# Patient Record
Sex: FEMALE | Race: WHITE | NOT HISPANIC OR LATINO
[De-identification: names, ages, dates, MRNs, and addresses within clinical notes are randomized per-mention and may not be internally consistent; named-entity substitution may affect disease eponyms.]

---

## 2021-08-11 PROBLEM — Z00.00 ENCOUNTER FOR PREVENTIVE HEALTH EXAMINATION: Status: ACTIVE | Noted: 2021-08-11

## 2021-08-13 ENCOUNTER — APPOINTMENT (OUTPATIENT)
Dept: OTOLARYNGOLOGY | Facility: CLINIC | Age: 31
End: 2021-08-13
Payer: COMMERCIAL

## 2021-08-13 VITALS
HEIGHT: 61 IN | HEART RATE: 60 BPM | SYSTOLIC BLOOD PRESSURE: 114 MMHG | WEIGHT: 118 LBS | DIASTOLIC BLOOD PRESSURE: 73 MMHG | BODY MASS INDEX: 22.28 KG/M2

## 2021-08-13 DIAGNOSIS — H61.20 IMPACTED CERUMEN, UNSPECIFIED EAR: ICD-10-CM

## 2021-08-13 PROCEDURE — 99203 OFFICE O/P NEW LOW 30 MIN: CPT | Mod: 25

## 2021-08-13 PROCEDURE — 69210 REMOVE IMPACTED EAR WAX UNI: CPT

## 2021-08-13 NOTE — HISTORY OF PRESENT ILLNESS
[de-identified] : RECURRENT RIGHT SUBMANDIBULAR DISCOMFORT SINCE JANUARY THIS YEAR\par SLIGHT DISCOMFORT AT THE TIME OF EXAM\par THINKS MIGHT BE PREGNANT

## 2021-08-13 NOTE — PHYSICAL EXAM
[de-identified] : RIGHT SUBMANDIBULAR GLAND INDURATION AND DIFFERENCE IN CONSISTENCY IN FAVOR OF CHRONIC SIALOADENITIS [de-identified] : YULISA IMPACTED CERUMEN REMOVED [Normal] : lingual tonsils are normal [Midline] : trachea located in midline position [de-identified] : RIGHT SUBMANDIBULAR GLAND ASYMETRY AND SLIGHT INDURATION IN FAVOR OF CHRONIC SIALOADENTITIS/ PALPABLE LUIS M DUCT INDURATION LIKELY OBSTRUCTING STONE normal

## 2021-08-13 NOTE — REVIEW OF SYSTEMS
[Swollen Glands] : swollen glands [Negative] : Endocrine [Patient Intake Form Reviewed] : Patient intake form was reviewed

## 2021-08-20 ENCOUNTER — OUTPATIENT (OUTPATIENT)
Dept: OUTPATIENT SERVICES | Facility: HOSPITAL | Age: 31
LOS: 1 days | End: 2021-08-20
Payer: COMMERCIAL

## 2021-08-20 ENCOUNTER — APPOINTMENT (OUTPATIENT)
Dept: ULTRASOUND IMAGING | Facility: CLINIC | Age: 31
End: 2021-08-20
Payer: COMMERCIAL

## 2021-08-20 DIAGNOSIS — K11.20 SIALOADENITIS, UNSPECIFIED: ICD-10-CM

## 2021-08-20 PROCEDURE — 76536 US EXAM OF HEAD AND NECK: CPT | Mod: 26

## 2021-08-20 PROCEDURE — 76536 US EXAM OF HEAD AND NECK: CPT

## 2021-08-21 ENCOUNTER — TRANSCRIPTION ENCOUNTER (OUTPATIENT)
Age: 31
End: 2021-08-21

## 2021-08-23 ENCOUNTER — APPOINTMENT (OUTPATIENT)
Dept: OTOLARYNGOLOGY | Facility: CLINIC | Age: 31
End: 2021-08-23

## 2021-10-11 ENCOUNTER — APPOINTMENT (OUTPATIENT)
Dept: OTOLARYNGOLOGY | Facility: CLINIC | Age: 31
End: 2021-10-11
Payer: COMMERCIAL

## 2021-10-11 VITALS
SYSTOLIC BLOOD PRESSURE: 109 MMHG | HEART RATE: 58 BPM | DIASTOLIC BLOOD PRESSURE: 73 MMHG | BODY MASS INDEX: 22.66 KG/M2 | HEIGHT: 61 IN | TEMPERATURE: 98.7 F | WEIGHT: 120 LBS

## 2021-10-11 DIAGNOSIS — K11.20 SIALOADENITIS, UNSPECIFIED: ICD-10-CM

## 2021-10-11 PROCEDURE — 99214 OFFICE O/P EST MOD 30 MIN: CPT

## 2021-10-11 NOTE — END OF VISIT
[FreeTextEntry3] : I personally saw and examined CALLI DILL in detail. I spoke to LESTER Patel regarding the assessment and plan of care.  I preformed the procedures and I reviewed the above assessment and plan of care, and agree. I have made changes in changes in the body of the note where appropriate.\par \par

## 2021-10-11 NOTE — CONSULT LETTER
[Please see my note below.] : Please see my note below. [FreeTextEntry1] : Dear Dr. FRANK JOSEPH AMICO \par I had the pleasure of evaluating your patient CALLI DILL, thank you for allowing us to participate in their care. please see full note detailing our visit below.\par If you have any questions, please do not hesitate to call me and I would be happy to discuss further. \par \par Efren Saleem M.D.\par Attending Physician,  \par Department of Otolaryngology - Head and Neck Surgery\par [facility] \par Office: [phone]\par Fax: [phone]\par \par

## 2021-10-11 NOTE — HISTORY OF PRESENT ILLNESS
[de-identified] : 32 y/o F with R SMG sialoadenitis x Jan 2021, on new years day woke up with swelling and pain, and pus that was coming out the mouth, states very difficult to get saliva out. Went to an ENT at the time, was told stone started on abx with relief. Second infection was in august, got another round of abx( augmentin) with relief. \par US done- small multiple stones measuring up to 2mm within the main duct. \par pt has tried lemon, sour candies, warm compress, with hydration with mild relief. \par pt deneis autoimmune d/o, dry eyes, dry mouth

## 2021-10-11 NOTE — ASSESSMENT
[FreeTextEntry1] : 32 y/o F who presents with R SMG sialoadenitis, on exam stone palpated and with edema. \par US shows multiple stones \par - Will proceed with regiment to increase salivary flow to reduce pooling in the gland and washout any possible obstruction if possible. Recommended hydration, regular massage, sour candies, and warm compress\par - Discussed options for recurrent submandibular sialoadenitis- observation with conservative management versus interventional sialoendoscopy vs excision of gland. \par Discussed details of the regiment/procedures and risks of each including but not limited to:\par interventional sialoendoscopy - bleeding, infection, scarring, inability to remove stone, ranula, ductal perforation/avulsion, injury to surrounding nerves, seroma, persistent sx \par Combined approach - increased risk of bleeding and infections injury to surrounding nerves including lingual, hypoglossal and sensory nerves. \par complete gland removal - external scar, injury to marginal mandibular nerve etc. \par Would like to do sialoendoscopy. Will book for procedure.\par - went over recovery instructions \par if does get pregnant can try office removal but unclear if will get all out or under sialoendscopy local only

## 2021-10-11 NOTE — PHYSICAL EXAM
[Midline] : trachea located in midline position [Normal] : no rashes [de-identified] : R SMG with edema, and stone palpated

## 2022-08-08 ENCOUNTER — APPOINTMENT (OUTPATIENT)
Dept: OBGYN | Facility: CLINIC | Age: 32
End: 2022-08-08

## 2022-08-08 VITALS
HEART RATE: 70 BPM | WEIGHT: 125 LBS | HEIGHT: 61 IN | RESPIRATION RATE: 16 BRPM | TEMPERATURE: 98.1 F | SYSTOLIC BLOOD PRESSURE: 108 MMHG | DIASTOLIC BLOOD PRESSURE: 68 MMHG | BODY MASS INDEX: 23.6 KG/M2

## 2022-08-08 PROCEDURE — 99203 OFFICE O/P NEW LOW 30 MIN: CPT | Mod: 25

## 2022-08-08 PROCEDURE — 36415 COLL VENOUS BLD VENIPUNCTURE: CPT

## 2022-08-08 PROCEDURE — 76830 TRANSVAGINAL US NON-OB: CPT

## 2022-08-08 NOTE — HISTORY OF PRESENT ILLNESS
[FreeTextEntry1] : Patient is a 31-year-old female who presents stating that she is pregnant as a product of IVF.  Patient states that her transfer date was June 16, 2022 of a 5-day embryo.  Patient states she was given a due date of March 4, 2023.  That would put the patient at 10 weeks 2 days gestation.  Patient advised to continue daily baby aspirin 81 mg.  Patient has discontinued estrogen and progesterone.

## 2022-08-08 NOTE — PROCEDURE
[Transvaginal OB Sonogram] : Transvaginal OB Sonogram [Intrauterine Pregnancy] : intrauterine pregnancy [Yolk Sac] : yolk sac present [Fetal Heart] : fetal heart present [CRL: ___ (mm)] : CRL - [unfilled]Umm [Date: ___] : Date: [unfilled] [Current GA by Sonogram: ___ (wks)] : Current GA by Sonogram: [unfilled]Uwks [___ day(s)] : [unfilled] days [Transvaginal OB Sonogram WNL] : Transvaginal OB Sonogram WNL [FreeTextEntry1] : Transvaginal OB ultrasound performed: A viable shaffer intrauterine gestation is seen, fetal heart motion is noted, fetal movement is seen, crown-rump length is consistent with 10 weeks 6 days gestation.  This correlates with the patient's dates

## 2022-08-09 LAB
ABO + RH PNL BLD: NORMAL
APPEARANCE: CLEAR
B19V IGG SER QL IA: 5.18 INDEX
B19V IGG+IGM SER-IMP: NORMAL
B19V IGG+IGM SER-IMP: POSITIVE
B19V IGM FLD-ACNC: 0.34 INDEX
B19V IGM SER-ACNC: NEGATIVE
BACTERIA: NEGATIVE
BASOPHILS # BLD AUTO: 0.07 K/UL
BASOPHILS NFR BLD AUTO: 0.8 %
BILIRUBIN URINE: NEGATIVE
BLD GP AB SCN SERPL QL: NORMAL
BLOOD URINE: NEGATIVE
CMV IGG SERPL QL: <0.2 U/ML
CMV IGG SERPL-IMP: NEGATIVE
CMV IGM SERPL QL: <8 AU/ML
CMV IGM SERPL QL: NEGATIVE
COLOR: COLORLESS
EOSINOPHIL # BLD AUTO: 0.31 K/UL
EOSINOPHIL NFR BLD AUTO: 3.6 %
GLUCOSE QUALITATIVE U: NEGATIVE
HBV SURFACE AG SER QL: NONREACTIVE
HCT VFR BLD CALC: 37 %
HCV AB SER QL: NONREACTIVE
HCV S/CO RATIO: 0.1 S/CO
HGB BLD-MCNC: 12.3 G/DL
HIV1+2 AB SPEC QL IA.RAPID: NONREACTIVE
HYALINE CASTS: 1 /LPF
IMM GRANULOCYTES NFR BLD AUTO: 0.5 %
KETONES URINE: NEGATIVE
LEUKOCYTE ESTERASE URINE: NEGATIVE
LYMPHOCYTES # BLD AUTO: 2.54 K/UL
LYMPHOCYTES NFR BLD AUTO: 29.7 %
MAN DIFF?: NORMAL
MCHC RBC-ENTMCNC: 28.5 PG
MCHC RBC-ENTMCNC: 33.2 GM/DL
MCV RBC AUTO: 85.8 FL
MEV IGG FLD QL IA: 35.9 AU/ML
MEV IGG+IGM SER-IMP: POSITIVE
MICROSCOPIC-UA: NORMAL
MONOCYTES # BLD AUTO: 0.51 K/UL
MONOCYTES NFR BLD AUTO: 6 %
NEUTROPHILS # BLD AUTO: 5.07 K/UL
NEUTROPHILS NFR BLD AUTO: 59.4 %
NITRITE URINE: NEGATIVE
PH URINE: 6.5
PLATELET # BLD AUTO: 336 K/UL
PROTEIN URINE: NEGATIVE
RBC # BLD: 4.31 M/UL
RBC # FLD: 13.5 %
RED BLOOD CELLS URINE: 3 /HPF
RUBV IGG FLD-ACNC: 3.2 INDEX
RUBV IGG SER-IMP: POSITIVE
SPECIFIC GRAVITY URINE: 1
SQUAMOUS EPITHELIAL CELLS: 1 /HPF
T GONDII AB SER-IMP: NEGATIVE
T GONDII AB SER-IMP: NEGATIVE
T GONDII IGG SER QL: <3 IU/ML
T GONDII IGM SER QL: <3 AU/ML
T PALLIDUM AB SER QL IA: NEGATIVE
T4 SERPL-MCNC: 11.2 UG/DL
TSH SERPL-ACNC: 0.92 UIU/ML
UROBILINOGEN URINE: NORMAL
VZV AB TITR SER: POSITIVE
VZV IGG SER IF-ACNC: 718.1 INDEX
WBC # FLD AUTO: 8.54 K/UL
WHITE BLOOD CELLS URINE: 4 /HPF

## 2022-08-10 LAB — BACTERIA UR CULT: NORMAL

## 2022-08-12 LAB — FMR1 GENE MUT ANL BLD/T: NORMAL

## 2022-08-13 LAB — AR GENE MUT ANL BLD/T: NORMAL

## 2022-08-16 LAB — CFTR MUT TESTED BLD/T: NEGATIVE

## 2022-08-22 ENCOUNTER — NON-APPOINTMENT (OUTPATIENT)
Age: 32
End: 2022-08-22

## 2022-08-22 ENCOUNTER — APPOINTMENT (OUTPATIENT)
Dept: OBGYN | Facility: CLINIC | Age: 32
End: 2022-08-22

## 2022-08-22 VITALS
SYSTOLIC BLOOD PRESSURE: 98 MMHG | HEIGHT: 61 IN | WEIGHT: 125 LBS | DIASTOLIC BLOOD PRESSURE: 60 MMHG | BODY MASS INDEX: 23.6 KG/M2

## 2022-08-22 DIAGNOSIS — Z11.3 ENCOUNTER FOR SCREENING FOR INFECTIONS WITH A PREDOMINANTLY SEXUAL MODE OF TRANSMISSION: ICD-10-CM

## 2022-08-22 DIAGNOSIS — Z12.4 ENCOUNTER FOR SCREENING FOR MALIGNANT NEOPLASM OF CERVIX: ICD-10-CM

## 2022-08-22 PROCEDURE — 0501F PRENATAL FLOW SHEET: CPT

## 2022-08-22 PROCEDURE — 76830 TRANSVAGINAL US NON-OB: CPT

## 2022-08-22 NOTE — PROCEDURE
[Intrauterine Pregnancy] : intrauterine pregnancy [Yolk Sac] : yolk sac present [Fetal Heart] : fetal heart present [CRL: ___ (mm)] : CRL - [unfilled]Umm [Date: ___] : EDC: [unfilled] [Current GA by Sonogram: ___ (wks)] : Current GA by Sonogram: [unfilled]Uwks [___ day(s)] : [unfilled] days [WNL] : Transvaginal OB Sonogram WNL [FreeTextEntry1] : Transvaginal OB ultrasound performed: A viable shaffer intrauterine gestation is seen, fetal heart motion is noted, fetal movement is seen, crown-rump length consistent with 12 weeks 6 days gestation.  This correlates with the patient's dates.

## 2022-08-23 ENCOUNTER — ASOB RESULT (OUTPATIENT)
Age: 32
End: 2022-08-23

## 2022-08-23 ENCOUNTER — APPOINTMENT (OUTPATIENT)
Dept: ANTEPARTUM | Facility: CLINIC | Age: 32
End: 2022-08-23

## 2022-08-23 LAB
C TRACH RRNA SPEC QL NAA+PROBE: NOT DETECTED
HPV HIGH+LOW RISK DNA PNL CVX: NOT DETECTED
N GONORRHOEA RRNA SPEC QL NAA+PROBE: NOT DETECTED
SOURCE TP AMPLIFICATION: NORMAL

## 2022-08-23 PROCEDURE — 76813 OB US NUCHAL MEAS 1 GEST: CPT

## 2022-08-24 ENCOUNTER — NON-APPOINTMENT (OUTPATIENT)
Age: 32
End: 2022-08-24

## 2022-08-24 DIAGNOSIS — N76.0 ACUTE VAGINITIS: ICD-10-CM

## 2022-08-24 DIAGNOSIS — B96.89 ACUTE VAGINITIS: ICD-10-CM

## 2022-08-24 LAB
CANDIDA VAG CYTO: NOT DETECTED
CYTOLOGY CVX/VAG DOC THIN PREP: NORMAL
G VAGINALIS+PREV SP MTYP VAG QL MICRO: DETECTED
T VAGINALIS VAG QL WET PREP: NOT DETECTED

## 2022-08-25 ENCOUNTER — NON-APPOINTMENT (OUTPATIENT)
Age: 32
End: 2022-08-25

## 2022-08-27 LAB — AFP PNL SERPL: NORMAL

## 2022-09-07 ENCOUNTER — NON-APPOINTMENT (OUTPATIENT)
Age: 32
End: 2022-09-07

## 2022-09-13 ENCOUNTER — NON-APPOINTMENT (OUTPATIENT)
Age: 32
End: 2022-09-13

## 2022-09-20 ENCOUNTER — NON-APPOINTMENT (OUTPATIENT)
Age: 32
End: 2022-09-20

## 2022-09-21 ENCOUNTER — APPOINTMENT (OUTPATIENT)
Dept: OBGYN | Facility: CLINIC | Age: 32
End: 2022-09-21

## 2022-09-21 VITALS
WEIGHT: 131 LBS | HEIGHT: 61 IN | BODY MASS INDEX: 24.73 KG/M2 | DIASTOLIC BLOOD PRESSURE: 60 MMHG | SYSTOLIC BLOOD PRESSURE: 100 MMHG

## 2022-09-21 PROCEDURE — 90686 IIV4 VACC NO PRSV 0.5 ML IM: CPT

## 2022-09-21 PROCEDURE — 0502F SUBSEQUENT PRENATAL CARE: CPT

## 2022-09-21 PROCEDURE — 90471 IMMUNIZATION ADMIN: CPT

## 2022-09-28 LAB
AFP PNL SERPL: NORMAL
CANDIDA VAG CYTO: NOT DETECTED
G VAGINALIS+PREV SP MTYP VAG QL MICRO: NOT DETECTED
T VAGINALIS VAG QL WET PREP: NOT DETECTED

## 2022-10-17 ENCOUNTER — NON-APPOINTMENT (OUTPATIENT)
Age: 32
End: 2022-10-17

## 2022-10-18 ENCOUNTER — APPOINTMENT (OUTPATIENT)
Dept: ANTEPARTUM | Facility: CLINIC | Age: 32
End: 2022-10-18

## 2022-10-18 ENCOUNTER — NON-APPOINTMENT (OUTPATIENT)
Age: 32
End: 2022-10-18

## 2022-10-18 ENCOUNTER — ASOB RESULT (OUTPATIENT)
Age: 32
End: 2022-10-18

## 2022-10-18 PROCEDURE — 76815 OB US LIMITED FETUS(S): CPT

## 2022-10-19 ENCOUNTER — TRANSCRIPTION ENCOUNTER (OUTPATIENT)
Age: 32
End: 2022-10-19

## 2022-10-19 ENCOUNTER — APPOINTMENT (OUTPATIENT)
Dept: OBGYN | Facility: CLINIC | Age: 32
End: 2022-10-19

## 2022-10-19 VITALS
WEIGHT: 135 LBS | HEART RATE: 72 BPM | OXYGEN SATURATION: 99 % | DIASTOLIC BLOOD PRESSURE: 68 MMHG | HEIGHT: 61 IN | BODY MASS INDEX: 25.49 KG/M2 | SYSTOLIC BLOOD PRESSURE: 104 MMHG

## 2022-10-19 VITALS — BODY MASS INDEX: 25.49 KG/M2 | WEIGHT: 135 LBS | HEIGHT: 61 IN

## 2022-10-19 DIAGNOSIS — Z01.818 ENCOUNTER FOR OTHER PREPROCEDURAL EXAMINATION: ICD-10-CM

## 2022-10-19 LAB
APTT BLD: 32.5 SEC
BASOPHILS # BLD AUTO: 0.07 K/UL
BASOPHILS NFR BLD AUTO: 1.1 %
EOSINOPHIL # BLD AUTO: 0.1 K/UL
EOSINOPHIL NFR BLD AUTO: 1.5 %
FIBRINOGEN PPP COAG.DERIVED-MCNC: 564 MG/DL
HCT VFR BLD CALC: 36.5 %
HGB BLD-MCNC: 12.1 G/DL
IMM GRANULOCYTES NFR BLD AUTO: 0.3 %
INR PPP: 0.98 RATIO
LYMPHOCYTES # BLD AUTO: 2.21 K/UL
LYMPHOCYTES NFR BLD AUTO: 33.9 %
MAN DIFF?: NORMAL
MCHC RBC-ENTMCNC: 28.8 PG
MCHC RBC-ENTMCNC: 33.2 GM/DL
MCV RBC AUTO: 86.9 FL
MONOCYTES # BLD AUTO: 0.37 K/UL
MONOCYTES NFR BLD AUTO: 5.7 %
NEUTROPHILS # BLD AUTO: 3.74 K/UL
NEUTROPHILS NFR BLD AUTO: 57.5 %
PLATELET # BLD AUTO: 276 K/UL
PT BLD: 11.6 SEC
RBC # BLD: 4.2 M/UL
RBC # FLD: 14 %
WBC # FLD AUTO: 6.51 K/UL

## 2022-10-19 PROCEDURE — 59200 INSERT CERVICAL DILATOR: CPT

## 2022-10-19 PROCEDURE — 99215 OFFICE O/P EST HI 40 MIN: CPT | Mod: 25

## 2022-10-19 RX ORDER — ACETAMINOPHEN 500 MG
975 TABLET ORAL ONCE
Refills: 0 | Status: COMPLETED | OUTPATIENT
Start: 2022-10-20 | End: 2022-10-20

## 2022-10-19 RX ORDER — AZITHROMYCIN 500 MG/1
500 TABLET, FILM COATED ORAL ONCE
Refills: 0 | Status: DISCONTINUED | OUTPATIENT
Start: 2022-10-20 | End: 2022-10-20

## 2022-10-19 RX ORDER — SODIUM CHLORIDE 9 MG/ML
3 INJECTION INTRAMUSCULAR; INTRAVENOUS; SUBCUTANEOUS ONCE
Refills: 0 | Status: DISCONTINUED | OUTPATIENT
Start: 2022-10-20 | End: 2022-10-20

## 2022-10-19 NOTE — DISCUSSION/SUMMARY
[FreeTextEntry1] : 33 yo  with fetal demise measuring 17 weeks for dilation and evacuation on 10/20/22\par \par 1.Dilation and Evacuation \par -All available records and ultrasounds have been reviewed \par - Pt does not desire induction of labor\par -All consents reviewed and/or signed today, all questions/concerns addressed\par - Patient offered pamphlet for support services- accepted\par - Patient offered fetal footprints - unsure\par - Genetics - s/p PGD\par - Reviewed disposal of remains, hospital vs. private burial.  Patient desires routine hospital disposal.\par \par 2. Surgery scheduling\par - Patient to be precertified for D+E\par - D+E scheduled for 10/20/22\par -COVID testing scheduled and reviewed\par \par 3. ID/Cervical prep\par - GC/CT- not indicated\par - Azithromycin 500mg in OR\par - Ibuprofen 600 mg po q 6 hours - Rx sent\par - Azithromycin 500mg  BID x 1 day for day of dilator placement\par \par 4. Labs/Blood type\par - to get CBC, T+S, coags this AM\par - TS on arrival\par \par 5. Contraception/Future Pregnancy Plans\par - Patient desires pregnancy\par \par 6. Post-op\par - Post-operative telehealth or in person visit optional- to be scheduled in 2 weeks\par - Post-operative instruction sheet reviewed/given, reviewed bleeding and infection precautions\par - Provided 24 hour contact phone number\par - All questions/concerns of patient addressed to their satisfaction\par

## 2022-10-19 NOTE — HISTORY OF PRESENT ILLNESS
[FreeTextEntry1] : 31 yo  with fetal demise measuring 17 weeks (at 20w4d by embyo transfer date). Pt was diagnosed yesterday and scheduled urgently today for procedure this week. This pregnancy was significant for recent covid inefection\par \par All: doxycycline, sulfa drugs\par Meds: levothyroxine 50mcg\par Obhx: IVF pregnancy\par Gynhx: PCOS\par PMH: hypothyroids\par PSH: denies\par SH: deniesx3\par \par \par \par D+E Counseling\par \par The patient presents aware of all options for the pregnancy, including continuation of pregnancy, labor induction, and dilation and evacuation (D&E). They desire termination. They do not desire a labor induction and are requesting a D&E.  Patient aware specimen does not come out intact.\par \par Risks of D&E including:\par 1.	Infection: Patient was counseled on risk of infection and the use of prophylactic antibiotics, signs/symptoms of pre- and post-operative infection were reviewed. \par 2.	Hemorrhage: Patient was counseled on the risk of hemorrhage, possibly requiring blood (and/or blood products) transfusion, management including use of but not limited to uterotonic medications. PT HAS NO OBJECTIONS TO BLOOD TRANSFUSION OR RECEIVING BLOOD PRODUCTS.\par 3.	Injury/Perforation:  Risk of injury to vagina, cervix, uterus reviewed. Patient was counseled on the risk of uterine perforation with/without need for laparoscopy/laparotomy with/without injury to adjacent organs such as bowel/bladder. Reviewed risk of hysterectomy.\par 4.            Risk of retained products of conception  with/without need for medication or suction procedure to empty the uterus. \par \par The risk of harm to subsequent pregnancies or the ability to carry a subsequent pregnancy to term, and adverse psychological effects were discussed.  \par \par Need for cervical ripening with misoprostol, mifepristone, pre-operative laminaria placement; the accompanying risks of infection, bleeding, injury, rupture of membranes, and  labor were reviewed. The risks of delivery of a nonviable  were reviewed.  They understand these risks and agrees to above. \par \par The patient also understands it is their responsibility to bring to the attention of their physician any unusual symptoms following the  and to report to follow-up examinations.  \par \par They are sure of their decision and deny any coercion from family, friends or healthcare providers. The patient had the opportunity to ask questions and all questions were answered.  \par \par

## 2022-10-19 NOTE — PHYSICAL EXAM
[Chaperone Present] : A chaperone was present in the examining room during all aspects of the physical examination [Appropriately responsive] : appropriately responsive [Alert] : alert [No Acute Distress] : no acute distress [Soft] : soft [Non-tender] : non-tender [Non-distended] : non-distended [FreeTextEntry1] : Dina Cooper LPN

## 2022-10-20 ENCOUNTER — APPOINTMENT (OUTPATIENT)
Dept: OBGYN | Facility: HOSPITAL | Age: 32
End: 2022-10-20

## 2022-10-20 ENCOUNTER — TRANSCRIPTION ENCOUNTER (OUTPATIENT)
Age: 32
End: 2022-10-20

## 2022-10-20 ENCOUNTER — APPOINTMENT (OUTPATIENT)
Dept: OBGYN | Facility: CLINIC | Age: 32
End: 2022-10-20

## 2022-10-20 ENCOUNTER — RESULT REVIEW (OUTPATIENT)
Age: 32
End: 2022-10-20

## 2022-10-20 ENCOUNTER — OUTPATIENT (OUTPATIENT)
Dept: INPATIENT UNIT | Facility: HOSPITAL | Age: 32
LOS: 1 days | End: 2022-10-20
Payer: COMMERCIAL

## 2022-10-20 VITALS
HEART RATE: 62 BPM | RESPIRATION RATE: 20 BRPM | SYSTOLIC BLOOD PRESSURE: 98 MMHG | DIASTOLIC BLOOD PRESSURE: 63 MMHG | OXYGEN SATURATION: 100 % | TEMPERATURE: 98 F

## 2022-10-20 VITALS
DIASTOLIC BLOOD PRESSURE: 61 MMHG | RESPIRATION RATE: 16 BRPM | SYSTOLIC BLOOD PRESSURE: 110 MMHG | HEIGHT: 61 IN | OXYGEN SATURATION: 100 % | TEMPERATURE: 98 F | HEART RATE: 889 BPM | WEIGHT: 134.92 LBS

## 2022-10-20 DIAGNOSIS — Z3A.17 17 WEEKS GESTATION OF PREGNANCY: ICD-10-CM

## 2022-10-20 DIAGNOSIS — O02.1 MISSED ABORTION: ICD-10-CM

## 2022-10-20 LAB — SARS-COV-2 N GENE NPH QL NAA+PROBE: NOT DETECTED

## 2022-10-20 PROCEDURE — 88305 TISSUE EXAM BY PATHOLOGIST: CPT | Mod: 26

## 2022-10-20 PROCEDURE — 76998 US GUIDE INTRAOP: CPT | Mod: 26,GC

## 2022-10-20 PROCEDURE — ZZZZZ: CPT

## 2022-10-20 PROCEDURE — 59841 INDUCED ABORTION DILAT&EVAC: CPT

## 2022-10-20 PROCEDURE — 59821 TREATMENT OF MISCARRIAGE: CPT | Mod: GC

## 2022-10-20 PROCEDURE — 36415 COLL VENOUS BLD VENIPUNCTURE: CPT

## 2022-10-20 PROCEDURE — 88305 TISSUE EXAM BY PATHOLOGIST: CPT

## 2022-10-20 RX ORDER — OXYCODONE AND ACETAMINOPHEN 5; 325 MG/1; MG/1
2 TABLET ORAL ONCE
Refills: 0 | Status: DISCONTINUED | OUTPATIENT
Start: 2022-10-20 | End: 2022-10-20

## 2022-10-20 RX ORDER — ONDANSETRON 8 MG/1
8 TABLET, FILM COATED ORAL ONCE
Refills: 0 | Status: DISCONTINUED | OUTPATIENT
Start: 2022-10-20 | End: 2022-10-20

## 2022-10-20 RX ORDER — ONDANSETRON 8 MG/1
4 TABLET, FILM COATED ORAL ONCE
Refills: 0 | Status: DISCONTINUED | OUTPATIENT
Start: 2022-10-20 | End: 2022-10-20

## 2022-10-20 RX ORDER — LEVOTHYROXINE SODIUM 125 MCG
1 TABLET ORAL
Qty: 0 | Refills: 0 | DISCHARGE

## 2022-10-20 RX ORDER — OXYCODONE AND ACETAMINOPHEN 5; 325 MG/1; MG/1
1 TABLET ORAL ONCE
Refills: 0 | Status: DISCONTINUED | OUTPATIENT
Start: 2022-10-20 | End: 2022-10-20

## 2022-10-20 RX ORDER — SODIUM CHLORIDE 9 MG/ML
1000 INJECTION, SOLUTION INTRAVENOUS
Refills: 0 | Status: DISCONTINUED | OUTPATIENT
Start: 2022-10-20 | End: 2022-10-20

## 2022-10-20 RX ORDER — HYDROMORPHONE HYDROCHLORIDE 2 MG/ML
0.5 INJECTION INTRAMUSCULAR; INTRAVENOUS; SUBCUTANEOUS ONCE
Refills: 0 | Status: DISCONTINUED | OUTPATIENT
Start: 2022-10-20 | End: 2022-10-20

## 2022-10-20 RX ADMIN — Medication 975 MILLIGRAM(S): at 15:11

## 2022-10-20 NOTE — BRIEF OPERATIVE NOTE - OPERATION/FINDINGS
Anteverted 17 week sized uterus, cervix dilated to 1 cm. Normal appearing external and internal genitalia.

## 2022-10-20 NOTE — ASU DISCHARGE PLAN (ADULT/PEDIATRIC) - ASU DC SPECIAL INSTRUCTIONSFT
Please call your provider to schedule postoperative visit in 2 weeks. Take medications as directed, regular diet, activity as tolerated. Nothing per vagina for 2 weeks (incl. sex, douching, etc). If you have additional concerns, please inform your provider.

## 2022-10-20 NOTE — BRIEF OPERATIVE NOTE - NSICDXBRIEFPOSTOP_GEN_ALL_CORE_FT
POST-OP DIAGNOSIS:  Missed  with fetal demise before 20 completed weeks of gestation 20-Oct-2022 17:15:01  Bhavna Brown

## 2022-10-20 NOTE — ASU DISCHARGE PLAN (ADULT/PEDIATRIC) - CARE PROVIDER_API CALL
Merlyn Wong; MPH)  Obstetrics and Gynecology  [address] [place] [address], [facility] [address]  [place] [address]  Phone: [phone]  Fax: [phone]  Follow Up Time:

## 2022-10-20 NOTE — ASU DISCHARGE PLAN (ADULT/PEDIATRIC) - NS MD DC FALL RISK RISK
For information on Fall & Injury Prevention, visit: [email or web address] OR  [email or web address] OR  https://www.cdc.gov/steadi/patient.html

## 2022-10-20 NOTE — BRIEF OPERATIVE NOTE - NSICDXBRIEFPREOP_GEN_ALL_CORE_FT
PRE-OP DIAGNOSIS:  Missed  with fetal demise before 20 completed weeks of gestation 20-Oct-2022 17:14:25  Bhavna Brown

## 2022-10-20 NOTE — BRIEF OPERATIVE NOTE - NSICDXBRIEFPROCEDURE_GEN_ALL_CORE_FT
PROCEDURES:  Dilation and evacuation, uterus, using suction, with US guidance 20-Oct-2022 17:13:56  Bhavna Brown

## 2022-10-27 LAB — SURGICAL PATHOLOGY STUDY: SIGNIFICANT CHANGE UP

## 2022-10-28 ENCOUNTER — APPOINTMENT (OUTPATIENT)
Dept: PEDIATRIC CARDIOLOGY | Facility: CLINIC | Age: 32
End: 2022-10-28

## 2022-11-02 ENCOUNTER — APPOINTMENT (OUTPATIENT)
Dept: OBGYN | Facility: CLINIC | Age: 32
End: 2022-11-02

## 2022-11-02 PROCEDURE — 99024 POSTOP FOLLOW-UP VISIT: CPT

## 2022-11-03 NOTE — HISTORY OF PRESENT ILLNESS
[FreeTextEntry1] : Audiovisual Televisit\par Pt location: home. [address], [place]\par Provider location: office, [address], [place]\par \par 31 yo s/p DE for fetal demise at 20 weks measuring ~17 weeks presenting for follow up. Phsycially doing well, no pain, minimal bleeding. Pyschosociall grieving. Encouraged to reach out to   bereavement group. I offered to reach out on her behalf but she says she will do on her own.\par Reviewed pathology. Minimal infomration about placenta. Pt was told by primary ob that placenta did nto look right. For peace of mind, I will reach out to [facility] pathology to inquire if there is any more info. Reviewed that pathologist will usually comment on abnormal appearing placenta, ie- calcifications, clots etc.

## 2022-11-03 NOTE — DISCUSSION/SUMMARY
[FreeTextEntry1] : 31 yo s/p DE for fetal demise\par \par 1.Dilation and Evacuation\par -Patient is recovering well.  No signs/symptoms of infection. \par -Reviewed pathology from procedure- will f/u with pathology re placenta\par -Reviewed that first period may be heavier than normal. \par -Patient is cleared to return to all physical activities\par \par 2.Contraception/ Conception\par - Wants to get Covid booster before trying again with LORETTA, has LORETTA follow up\par \par 3.  Psych\par - Discussed normal grieving process, reviewed support people\par - pt given social work/psych information sheet at consultation\par \par 4. Follow-up\par - Patient referred back to her primary Ob-gyn, Dr. Foy for routine care\par - - all questions/concerns addressed of pt to their satisfaction\par

## 2023-04-18 ENCOUNTER — APPOINTMENT (OUTPATIENT)
Dept: OBGYN | Facility: CLINIC | Age: 33
End: 2023-04-18
Payer: COMMERCIAL

## 2023-04-18 VITALS
BODY MASS INDEX: 24.35 KG/M2 | WEIGHT: 129 LBS | HEART RATE: 79 BPM | HEIGHT: 61 IN | TEMPERATURE: 98.4 F | OXYGEN SATURATION: 98 % | RESPIRATION RATE: 14 BRPM | SYSTOLIC BLOOD PRESSURE: 115 MMHG | DIASTOLIC BLOOD PRESSURE: 64 MMHG

## 2023-04-18 PROCEDURE — 81003 URINALYSIS AUTO W/O SCOPE: CPT | Mod: NC,QW

## 2023-04-18 PROCEDURE — 76817 TRANSVAGINAL US OBSTETRIC: CPT

## 2023-04-18 PROCEDURE — 81025 URINE PREGNANCY TEST: CPT

## 2023-04-18 PROCEDURE — 99215 OFFICE O/P EST HI 40 MIN: CPT | Mod: 25
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
## 2023-04-28 ENCOUNTER — ASOB RESULT (OUTPATIENT)
Age: 33
End: 2023-04-28

## 2023-04-28 ENCOUNTER — APPOINTMENT (OUTPATIENT)
Dept: MATERNAL FETAL MEDICINE | Facility: CLINIC | Age: 33
End: 2023-04-28
Payer: COMMERCIAL

## 2023-04-28 PROCEDURE — 99202 OFFICE O/P NEW SF 15 MIN: CPT | Mod: 95

## 2023-05-10 ENCOUNTER — ASOB RESULT (OUTPATIENT)
Age: 33
End: 2023-05-10

## 2023-05-10 ENCOUNTER — APPOINTMENT (OUTPATIENT)
Dept: ANTEPARTUM | Facility: CLINIC | Age: 33
End: 2023-05-10
Payer: COMMERCIAL

## 2023-05-10 ENCOUNTER — APPOINTMENT (OUTPATIENT)
Dept: MATERNAL FETAL MEDICINE | Facility: CLINIC | Age: 33
End: 2023-05-10
Payer: COMMERCIAL

## 2023-05-10 VITALS
OXYGEN SATURATION: 98 % | HEART RATE: 84 BPM | DIASTOLIC BLOOD PRESSURE: 64 MMHG | WEIGHT: 133 LBS | SYSTOLIC BLOOD PRESSURE: 110 MMHG | BODY MASS INDEX: 25.13 KG/M2

## 2023-05-10 DIAGNOSIS — O35.9XX0 MATERNAL CARE FOR (SUSPECTED) FETAL ABNORMALITY AND DAMAGE, UNSPECIFIED, NOT APPLICABLE OR UNSPECIFIED: ICD-10-CM

## 2023-05-10 DIAGNOSIS — Z83.49 FAMILY HISTORY OF OTHER ENDOCRINE, NUTRITIONAL AND METABOLIC DISEASES: ICD-10-CM

## 2023-05-10 DIAGNOSIS — Z82.0 FAMILY HISTORY OF EPILEPSY AND OTHER DISEASES OF THE NERVOUS SYSTEM: ICD-10-CM

## 2023-05-10 DIAGNOSIS — Z3A.12 12 WEEKS GESTATION OF PREGNANCY: ICD-10-CM

## 2023-05-10 DIAGNOSIS — Z87.42 PERSONAL HISTORY OF OTHER DISEASES OF THE FEMALE GENITAL TRACT: ICD-10-CM

## 2023-05-10 PROCEDURE — 76813 OB US NUCHAL MEAS 1 GEST: CPT

## 2023-05-10 PROCEDURE — ZZZZZ: CPT

## 2023-05-10 PROCEDURE — 99215 OFFICE O/P EST HI 40 MIN: CPT

## 2023-05-10 PROCEDURE — 36416 COLLJ CAPILLARY BLOOD SPEC: CPT

## 2023-05-10 RX ORDER — METRONIDAZOLE 7.5 MG/G
0.75 GEL VAGINAL
Qty: 1 | Refills: 0 | Status: DISCONTINUED | COMMUNITY
Start: 2022-08-24 | End: 2023-05-10

## 2023-05-10 RX ORDER — CHOLECALCIFEROL (VITAMIN D3) 25 MCG
27-0.8-228 TABLET,CHEWABLE ORAL
Qty: 30 | Refills: 6 | Status: ACTIVE | COMMUNITY
Start: 2023-05-10

## 2023-05-10 RX ORDER — KRILL/OM-3/DHA/EPA/PHOSPHO/AST 1000-230MG
81 CAPSULE ORAL DAILY
Qty: 30 | Refills: 5 | Status: ACTIVE | COMMUNITY
Start: 2023-05-10

## 2023-05-10 RX ORDER — AZITHROMYCIN 500 MG/1
500 TABLET, FILM COATED ORAL
Qty: 2 | Refills: 0 | Status: DISCONTINUED | COMMUNITY
Start: 2022-10-19 | End: 2023-05-10

## 2023-05-10 RX ORDER — IBUPROFEN 600 MG/1
600 TABLET, FILM COATED ORAL 4 TIMES DAILY
Qty: 60 | Refills: 0 | Status: DISCONTINUED | COMMUNITY
Start: 2022-10-19 | End: 2023-05-10

## 2023-05-15 ENCOUNTER — NON-APPOINTMENT (OUTPATIENT)
Age: 33
End: 2023-05-15

## 2023-05-15 LAB
ADDITIONAL US: NORMAL
COMMENTS: AFP: NORMAL
CRL SCAN TWIN B: NORMAL
CRL SCAN: NORMAL
CROWN RUMP LENGTH TWIN B: NORMAL
CROWN RUMP LENGTH: 61.3 MM
DOWN SYNDROME AGE RISK: NORMAL
DOWN SYNDROME INTERPRETATION: NORMAL
DOWN SYNDROME SCREENING RISK: NORMAL
GEST. AGE ON COLLECTION DATE: 12.4 WEEKS
HCG MOM: 0.77
HCG VALUE: 83.1 IU/ML
MATERNAL AGE AT EDD: 32.5 YR
NOTE: AFP: NORMAL
NT MOM TWIN B: NORMAL
NT TWIN B: NORMAL
NUCHAL TRANSLUCENCY (NT): 1.5 MM
NUCHAL TRANSLUCENCY MOM: 1.03
NUMBER OF FETUSES: 1
PAPP-A MOM: 0.35
PAPP-A VALUE: 403.2 NG/ML
RACE: NORMAL
RESULTS AFP: NORMAL
SONOGRAPHER ID#: NORMAL
SUBMIT PART 2 SAMPLE USING: NORMAL
TEST RESULTS: AFP: NORMAL
TRISOMY 18 AGE RISK: NORMAL
TRISOMY 18 INTERPRETATION: NORMAL
TRISOMY 18 SCREENING RISK: NORMAL
WEIGHT AFP: 133 LBS

## 2023-05-16 ENCOUNTER — APPOINTMENT (OUTPATIENT)
Dept: OBGYN | Facility: CLINIC | Age: 33
End: 2023-05-16
Payer: COMMERCIAL

## 2023-05-16 ENCOUNTER — LABORATORY RESULT (OUTPATIENT)
Age: 33
End: 2023-05-16

## 2023-05-16 VITALS
RESPIRATION RATE: 16 BRPM | OXYGEN SATURATION: 97 % | HEART RATE: 94 BPM | BODY MASS INDEX: 25.13 KG/M2 | TEMPERATURE: 98 F | DIASTOLIC BLOOD PRESSURE: 64 MMHG | WEIGHT: 133 LBS | SYSTOLIC BLOOD PRESSURE: 100 MMHG

## 2023-05-16 DIAGNOSIS — Z34.01 ENCOUNTER FOR SUPERVISION OF NORMAL FIRST PREGNANCY, FIRST TRIMESTER: ICD-10-CM

## 2023-05-16 PROCEDURE — 36415 COLL VENOUS BLD VENIPUNCTURE: CPT

## 2023-05-16 PROCEDURE — 0502F SUBSEQUENT PRENATAL CARE: CPT

## 2023-05-16 PROCEDURE — 76817 TRANSVAGINAL US OBSTETRIC: CPT

## 2023-05-16 PROCEDURE — 81003 URINALYSIS AUTO W/O SCOPE: CPT | Mod: NC,QW

## 2023-05-17 ENCOUNTER — NON-APPOINTMENT (OUTPATIENT)
Age: 33
End: 2023-05-17

## 2023-05-17 LAB
APPEARANCE: CLEAR
BASOPHILS # BLD AUTO: 0.05 K/UL
BASOPHILS NFR BLD AUTO: 0.7 %
BILIRUBIN URINE: NEGATIVE
BLOOD URINE: NEGATIVE
COLOR: YELLOW
EOSINOPHIL # BLD AUTO: 0.1 K/UL
EOSINOPHIL NFR BLD AUTO: 1.4 %
GLUCOSE QUALITATIVE U: NEGATIVE MG/DL
HCT VFR BLD CALC: 33.9 %
HGB BLD-MCNC: 10.7 G/DL
HIV1+2 AB SPEC QL IA.RAPID: NONREACTIVE
IMM GRANULOCYTES NFR BLD AUTO: 0.4 %
KETONES URINE: NEGATIVE MG/DL
LEUKOCYTE ESTERASE URINE: ABNORMAL
LYMPHOCYTES # BLD AUTO: 2.65 K/UL
LYMPHOCYTES NFR BLD AUTO: 37.4 %
MAN DIFF?: NORMAL
MCHC RBC-ENTMCNC: 25.1 PG
MCHC RBC-ENTMCNC: 31.6 GM/DL
MCV RBC AUTO: 79.6 FL
MONOCYTES # BLD AUTO: 0.43 K/UL
MONOCYTES NFR BLD AUTO: 6.1 %
NEUTROPHILS # BLD AUTO: 3.83 K/UL
NEUTROPHILS NFR BLD AUTO: 54 %
NITRITE URINE: NEGATIVE
PH URINE: 6
PLATELET # BLD AUTO: 325 K/UL
PROTEIN URINE: NEGATIVE MG/DL
RBC # BLD: 4.26 M/UL
RBC # FLD: 18.4 %
SPECIFIC GRAVITY URINE: 1.01
TSH SERPL-ACNC: 0.76 UIU/ML
UROBILINOGEN URINE: 0.2 MG/DL
WBC # FLD AUTO: 7.09 K/UL

## 2023-05-18 LAB
ABO + RH PNL BLD: NORMAL
BACTERIA UR CULT: NORMAL
BLD GP AB SCN SERPL QL: NORMAL
HBV SURFACE AG SER QL: NONREACTIVE
HCV AB SER QL: NONREACTIVE
HCV S/CO RATIO: 0.09 S/CO
HGB A MFR BLD: 97.5 %
HGB A2 MFR BLD: 2.5 %
HGB FRACT BLD-IMP: NORMAL
LEAD BLD-MCNC: <1 UG/DL
MEV IGG FLD QL IA: 33.4 AU/ML
MEV IGG+IGM SER-IMP: POSITIVE
RUBV IGG FLD-ACNC: 3.4 INDEX
RUBV IGG SER-IMP: POSITIVE
T PALLIDUM AB SER QL IA: NEGATIVE
VZV AB TITR SER: POSITIVE
VZV IGG SER IF-ACNC: 907.2 INDEX

## 2023-05-19 NOTE — DISCUSSION/SUMMARY
[FreeTextEntry1] : The patient is a 32-year-old -0-1-0 being seen today at 12 weeks for pregnancy achieved through in vitro fertilization, hypothyroidism, history of polycystic ovarian syndrome as well as a previous pregnancy with fetal demise at 20 weeks.\par \par Obstetrical history significant for pregnancy in .  Patient states she had an uncomplicated prenatal course.  She was seen at approximately 20 weeks for routine ultrasound and a fetal demise was diagnosed.  Sonographic gestational age was approximately 16 weeks.  She states that she had COVID at 16 weeks which was mild.  A D&E was performed and pathology report was consistent with a 16-week fetal loss.  No evidence of infection or other abnormalities were noted.  In addition limited anatomical details were available due to D&E, however normal-appearing arms and legs were noted.\par \par Ultra-Screen evaluation was performed and revealed a single viable intrauterine gestation with size consistent with dates.  No gross or soft markers associated fetal aneuploidy were noted.  Nuchal translucency was found to be within normal limits.  Vital signs today reveal a blood pressure of 110/64, maternal weight is 133 pounds consistent with a BMI of 25.13 kg.\par \par In vitro fertilization;\par \par This pregnancy was achieved through in vitro fertilization with polycystic ovarian syndrome as the cause for infertility.  Pregnancies achieved thru in vitro fertilization have a slightly higher incidence of congenital heart defects and therefore a fetal echo between 20 and 22 weeks will be scheduled.\par \par Intrauterine fetal demise;\par \par We reviewed the previous pregnancy.  Kia states that she had antiphospholipid antibody and thrombophilia work-up performed which were normal.  Cytogenetic studies are not available to me at time of consultation.  Causes and possible etiologies for intrauterine fetal demise were discussed.  The patient has had Ultra-Screen evaluation, noninterventional prenatal screening and PGD testing performed which all demonstrate low risk for fetal aneuploidy.  It is possible that the COVID infection during her pregnancy may have been the cause for her pregnancy loss.  No other complications, medical issues or predisposing factors are identified to explain her loss.  She is on low-dose aspirin alternating 1 tablet with 2 tablets which she will take until 38 weeks.  She will return at 16 weeks for a targeted ultrasound and a comprehensive ultrasound at 20 weeks will be scheduled.  Prophylactic anticoagulant therapy is not recommended.  All the above was discussed with the patient and her  and all their questions were answered.\par \par Hypothyroidism;\par \par The patient is on 50 mg of Synthroid Monday to Friday and 100 mg on the weekends.  Her last TSH is normal and adjustments of this medication regimen is not recommended.\par \par COVID-19 vaccination;\par \par COVID-19 vaccination in pregnancy was discussed.  The patient has completed her initial vaccination as well as a booster vaccine with the Pfizer vaccine.  She will contact your office should she have a COVID infection to discuss treatment options.\par \par Her father has multiple sclerosis and her mother has hypertension.  She has a history of polycystic ovarian syndrome and hypothyroidism.  She has had a D&C and hysteroscopy.  She is allergic to sulfa based medications and doxycycline and denies alcohol, tobacco or drug use.\par \par I spent a total of 45 minutes reviewing the patient's prenatal record, prenatal blood work, outside medical records, previous consultations and ultrasound reports counseling coordinating care.\par \par Recommendations;\par \par 1.  Targeted ultrasound at 16 weeks followed by comprehensive ultrasound at 20 weeks.\par 2.  Low-dose aspirin alternating 1 tablet and 2 tablets until 38 weeks.\par 3.  Fetal echo between 20 and 22 weeks.\par 4.  3-hour glucose tolerance test between 24 and 28 weeks secondary to history of polycystic ovarian syndrome.\par 5.  Continue current thyroid medication.  TSH every 6 to 8 weeks is recommended.\par 6.  Follow-up maternal-fetal medicine consultation as clinically indicated.

## 2023-05-22 ENCOUNTER — NON-APPOINTMENT (OUTPATIENT)
Age: 33
End: 2023-05-22

## 2023-05-23 ENCOUNTER — NON-APPOINTMENT (OUTPATIENT)
Age: 33
End: 2023-05-23

## 2023-05-25 ENCOUNTER — NON-APPOINTMENT (OUTPATIENT)
Age: 33
End: 2023-05-25

## 2023-05-26 ENCOUNTER — APPOINTMENT (OUTPATIENT)
Dept: OBGYN | Facility: CLINIC | Age: 33
End: 2023-05-26
Payer: COMMERCIAL

## 2023-05-26 VITALS
SYSTOLIC BLOOD PRESSURE: 115 MMHG | WEIGHT: 135 LBS | HEIGHT: 61 IN | DIASTOLIC BLOOD PRESSURE: 70 MMHG | BODY MASS INDEX: 25.49 KG/M2

## 2023-05-26 LAB
BILIRUB UR QL STRIP: NORMAL
CLARITY UR: CLEAR
COLLECTION METHOD: NORMAL
GLUCOSE UR-MCNC: NORMAL
HCG UR QL: 0.2 EU/DL
HGB UR QL STRIP.AUTO: NORMAL
KETONES UR-MCNC: NORMAL
LEUKOCYTE ESTERASE UR QL STRIP: NORMAL
NITRITE UR QL STRIP: NORMAL
PH UR STRIP: 6
PROT UR STRIP-MCNC: NORMAL
SP GR UR STRIP: 1

## 2023-05-26 PROCEDURE — 0502F SUBSEQUENT PRENATAL CARE: CPT

## 2023-05-26 PROCEDURE — 81003 URINALYSIS AUTO W/O SCOPE: CPT | Mod: NC,QW

## 2023-05-30 LAB — CYTOLOGY CVX/VAG DOC THIN PREP: NORMAL

## 2023-06-08 ENCOUNTER — APPOINTMENT (OUTPATIENT)
Dept: ANTEPARTUM | Facility: CLINIC | Age: 33
End: 2023-06-08
Payer: COMMERCIAL

## 2023-06-08 ENCOUNTER — ASOB RESULT (OUTPATIENT)
Age: 33
End: 2023-06-08

## 2023-06-08 PROCEDURE — 76815 OB US LIMITED FETUS(S): CPT

## 2023-06-08 PROCEDURE — 76817 TRANSVAGINAL US OBSTETRIC: CPT

## 2023-06-12 ENCOUNTER — NON-APPOINTMENT (OUTPATIENT)
Age: 33
End: 2023-06-12

## 2023-06-14 ENCOUNTER — APPOINTMENT (OUTPATIENT)
Dept: OBGYN | Facility: CLINIC | Age: 33
End: 2023-06-14
Payer: COMMERCIAL

## 2023-06-14 VITALS
DIASTOLIC BLOOD PRESSURE: 77 MMHG | RESPIRATION RATE: 16 BRPM | HEART RATE: 94 BPM | HEIGHT: 61 IN | OXYGEN SATURATION: 98 % | BODY MASS INDEX: 25.86 KG/M2 | SYSTOLIC BLOOD PRESSURE: 110 MMHG | TEMPERATURE: 98 F | WEIGHT: 137 LBS

## 2023-06-14 DIAGNOSIS — Z34.92 ENCOUNTER FOR SUPERVISION OF NORMAL PREGNANCY, UNSPECIFIED, SECOND TRIMESTER: ICD-10-CM

## 2023-06-14 PROCEDURE — 0502F SUBSEQUENT PRENATAL CARE: CPT

## 2023-06-14 PROCEDURE — 76816 OB US FOLLOW-UP PER FETUS: CPT

## 2023-06-27 ENCOUNTER — NON-APPOINTMENT (OUTPATIENT)
Age: 33
End: 2023-06-27

## 2023-06-28 ENCOUNTER — APPOINTMENT (OUTPATIENT)
Dept: OBGYN | Facility: CLINIC | Age: 33
End: 2023-06-28
Payer: COMMERCIAL

## 2023-06-28 VITALS
HEIGHT: 61 IN | OXYGEN SATURATION: 98 % | RESPIRATION RATE: 14 BRPM | DIASTOLIC BLOOD PRESSURE: 60 MMHG | SYSTOLIC BLOOD PRESSURE: 87 MMHG | HEART RATE: 87 BPM | TEMPERATURE: 98.6 F

## 2023-06-28 PROCEDURE — 0502F SUBSEQUENT PRENATAL CARE: CPT

## 2023-06-28 PROCEDURE — 76816 OB US FOLLOW-UP PER FETUS: CPT

## 2023-07-03 ENCOUNTER — NON-APPOINTMENT (OUTPATIENT)
Age: 33
End: 2023-07-03

## 2023-07-03 LAB
ADDITIONAL US: NORMAL
AFP MOM: 0.87
AFP VALUE: 36.4 NG/ML
C TRACH RRNA SPEC QL NAA+PROBE: NOT DETECTED
COLLECTED ON 2: NORMAL
COLLECTED ON: NORMAL
CRL SCAN TWIN B: NORMAL
CRL SCAN: NORMAL
CROWN RUMP LENGTH TWIN B: NORMAL
CROWN RUMP LENGTH: 61.3 MM
DIA MOM: 0.63
DIA VALUE: 102.3 PG/ML
DOWN SYNDROME AGE RISK: NORMAL
DOWN SYNDROME INTERPRETATION: NORMAL
DOWN SYNDROME SCREENING RISK: NORMAL
FIRST TRIMESTER SAMPLE: NORMAL
GEST. AGE ON COLLECTION DATE: 12.4 WEEKS
GESTATIONAL AGE: 17.4 WEEKS
HCG MOM: 0.83
HCG VALUE: 25.1 IU/ML
INSULIN DEP DIABETES: NO
MATERNAL AGE AT EDD: 32.4 YR
N GONORRHOEA RRNA SPEC QL NAA+PROBE: NOT DETECTED
NT MOM TWIN B: NORMAL
NT TWIN B: NORMAL
NUCHAL TRANSLUCENCY (NT): 1.5 MM
NUCHAL TRANSLUCENCY MOM: 1.03
NUMBER OF FETUSES: 1
OPEN SPINA BIFIDA: NORMAL
OSB INTERPRETATION: NORMAL
PAPP-A MOM: 0.35
PAPP-A VALUE: 403.2 NG/ML
RACE: NORMAL
SECOND TRIMESTER SAMPLE: NORMAL
SEQUENTIAL 2 COMMENTS: NORMAL
SEQUENTIAL 2 NOTE: NORMAL
SEQUENTIAL 2 RESULTS: NORMAL
SEQUENTIAL 2 TEST RESULTS: NORMAL
SONOGRAPHER ID#: NORMAL
SOURCE AMPLIFICATION: NORMAL
TRISOMY 18 AGE RISK: NORMAL
TRISOMY 18 INTERPRETATION: NORMAL
TRISOMY 18 SCREENING RISK: NORMAL
TSH SERPL-ACNC: 1.21 UIU/ML
UE3 MOM: 0.8
UE3 VALUE: 1.03 NG/ML
WEIGHT AFP: 133 LBS
WEIGHT: 135 LBS

## 2023-07-06 ENCOUNTER — APPOINTMENT (OUTPATIENT)
Dept: ANTEPARTUM | Facility: CLINIC | Age: 33
End: 2023-07-06
Payer: COMMERCIAL

## 2023-07-06 ENCOUNTER — ASOB RESULT (OUTPATIENT)
Age: 33
End: 2023-07-06

## 2023-07-06 PROCEDURE — 76817 TRANSVAGINAL US OBSTETRIC: CPT

## 2023-07-06 PROCEDURE — 76811 OB US DETAILED SNGL FETUS: CPT

## 2023-07-11 ENCOUNTER — APPOINTMENT (OUTPATIENT)
Dept: PEDIATRIC CARDIOLOGY | Facility: CLINIC | Age: 33
End: 2023-07-11
Payer: COMMERCIAL

## 2023-07-11 DIAGNOSIS — O02.1 MISSED ABORTION: ICD-10-CM

## 2023-07-11 DIAGNOSIS — O35.9XX0 MATERNAL CARE FOR (SUSPECTED) FETAL ABNORMALITY AND DAMAGE, UNSPECIFIED, NOT APPLICABLE OR UNSPECIFIED: ICD-10-CM

## 2023-07-11 DIAGNOSIS — Z3A.20 20 WEEKS GESTATION OF PREGNANCY: ICD-10-CM

## 2023-07-11 DIAGNOSIS — O09.811 SUPERVISION OF PREGNANCY RESULTING FROM ASSISTED REPRODUCTIVE TECHNOLOGY, FIRST TRIMESTER: ICD-10-CM

## 2023-07-11 PROCEDURE — 76820 UMBILICAL ARTERY ECHO: CPT

## 2023-07-11 PROCEDURE — 93325 DOPPLER ECHO COLOR FLOW MAPG: CPT | Mod: 59

## 2023-07-11 PROCEDURE — 76821 MIDDLE CEREBRAL ARTERY ECHO: CPT

## 2023-07-11 PROCEDURE — 76827 ECHO EXAM OF FETAL HEART: CPT

## 2023-07-11 PROCEDURE — 99204 OFFICE O/P NEW MOD 45 MIN: CPT | Mod: 25

## 2023-07-11 PROCEDURE — 76825 ECHO EXAM OF FETAL HEART: CPT

## 2023-07-12 ENCOUNTER — NON-APPOINTMENT (OUTPATIENT)
Age: 33
End: 2023-07-12

## 2023-07-13 ENCOUNTER — APPOINTMENT (OUTPATIENT)
Dept: OBGYN | Facility: CLINIC | Age: 33
End: 2023-07-13
Payer: COMMERCIAL

## 2023-07-13 VITALS
HEIGHT: 61 IN | DIASTOLIC BLOOD PRESSURE: 68 MMHG | WEIGHT: 142 LBS | BODY MASS INDEX: 26.81 KG/M2 | SYSTOLIC BLOOD PRESSURE: 102 MMHG

## 2023-07-13 PROCEDURE — 76816 OB US FOLLOW-UP PER FETUS: CPT

## 2023-07-13 PROCEDURE — 81003 URINALYSIS AUTO W/O SCOPE: CPT | Mod: NC,QW

## 2023-07-13 PROCEDURE — 0502F SUBSEQUENT PRENATAL CARE: CPT

## 2023-07-31 ENCOUNTER — NON-APPOINTMENT (OUTPATIENT)
Age: 33
End: 2023-07-31

## 2023-08-07 ENCOUNTER — APPOINTMENT (OUTPATIENT)
Dept: OBGYN | Facility: CLINIC | Age: 33
End: 2023-08-07
Payer: COMMERCIAL

## 2023-08-07 VITALS
BODY MASS INDEX: 38.14 KG/M2 | SYSTOLIC BLOOD PRESSURE: 104 MMHG | DIASTOLIC BLOOD PRESSURE: 68 MMHG | WEIGHT: 202 LBS | HEIGHT: 61 IN

## 2023-08-07 VITALS
WEIGHT: 148 LBS | HEIGHT: 61 IN | SYSTOLIC BLOOD PRESSURE: 110 MMHG | BODY MASS INDEX: 27.94 KG/M2 | DIASTOLIC BLOOD PRESSURE: 66 MMHG

## 2023-08-07 PROCEDURE — 76816 OB US FOLLOW-UP PER FETUS: CPT

## 2023-08-07 PROCEDURE — 0502F SUBSEQUENT PRENATAL CARE: CPT

## 2023-08-07 PROCEDURE — 81003 URINALYSIS AUTO W/O SCOPE: CPT | Mod: NC,QW

## 2023-08-08 LAB
BASOPHILS # BLD AUTO: 0.05 K/UL
BASOPHILS NFR BLD AUTO: 0.6 %
BILIRUB UR QL STRIP: NORMAL
EOSINOPHIL # BLD AUTO: 0.11 K/UL
EOSINOPHIL NFR BLD AUTO: 1.2 %
GLUCOSE UR-MCNC: NORMAL
HCG UR QL: 0.2 EU/DL
HCT VFR BLD CALC: 38.7 %
HGB BLD-MCNC: 12.6 G/DL
HGB UR QL STRIP.AUTO: NORMAL
IMM GRANULOCYTES NFR BLD AUTO: 1.3 %
KETONES UR-MCNC: NORMAL
LEUKOCYTE ESTERASE UR QL STRIP: NORMAL
LYMPHOCYTES # BLD AUTO: 2.01 K/UL
LYMPHOCYTES NFR BLD AUTO: 22.5 %
MAN DIFF?: NORMAL
MCHC RBC-ENTMCNC: 29 PG
MCHC RBC-ENTMCNC: 32.6 GM/DL
MCV RBC AUTO: 89 FL
MONOCYTES # BLD AUTO: 0.5 K/UL
MONOCYTES NFR BLD AUTO: 5.6 %
NEUTROPHILS # BLD AUTO: 6.13 K/UL
NEUTROPHILS NFR BLD AUTO: 68.8 %
NITRITE UR QL STRIP: NORMAL
PH UR STRIP: 7.5
PLATELET # BLD AUTO: 260 K/UL
PROT UR STRIP-MCNC: NORMAL
RBC # BLD: 4.35 M/UL
RBC # FLD: 16.5 %
SP GR UR STRIP: 1.01
TSH SERPL-ACNC: 1.41 UIU/ML
WBC # FLD AUTO: 8.92 K/UL

## 2023-08-17 ENCOUNTER — APPOINTMENT (OUTPATIENT)
Dept: ANTEPARTUM | Facility: CLINIC | Age: 33
End: 2023-08-17
Payer: COMMERCIAL

## 2023-08-17 ENCOUNTER — ASOB RESULT (OUTPATIENT)
Age: 33
End: 2023-08-17

## 2023-08-17 PROCEDURE — 76816 OB US FOLLOW-UP PER FETUS: CPT

## 2023-08-29 LAB — GLUCOSE 1H P 50 G GLC PO SERPL-MCNC: 141 MG/DL

## 2023-08-31 ENCOUNTER — NON-APPOINTMENT (OUTPATIENT)
Age: 33
End: 2023-08-31

## 2023-09-07 ENCOUNTER — APPOINTMENT (OUTPATIENT)
Dept: OBGYN | Facility: CLINIC | Age: 33
End: 2023-09-07
Payer: COMMERCIAL

## 2023-09-07 VITALS
DIASTOLIC BLOOD PRESSURE: 60 MMHG | WEIGHT: 155 LBS | BODY MASS INDEX: 29.27 KG/M2 | SYSTOLIC BLOOD PRESSURE: 96 MMHG | HEIGHT: 61 IN

## 2023-09-07 PROCEDURE — 90715 TDAP VACCINE 7 YRS/> IM: CPT

## 2023-09-07 PROCEDURE — 0502F SUBSEQUENT PRENATAL CARE: CPT

## 2023-09-07 PROCEDURE — 90471 IMMUNIZATION ADMIN: CPT

## 2023-09-15 ENCOUNTER — APPOINTMENT (OUTPATIENT)
Dept: ANTEPARTUM | Facility: CLINIC | Age: 33
End: 2023-09-15
Payer: COMMERCIAL

## 2023-09-15 ENCOUNTER — ASOB RESULT (OUTPATIENT)
Age: 33
End: 2023-09-15

## 2023-09-15 PROCEDURE — 76816 OB US FOLLOW-UP PER FETUS: CPT

## 2023-09-20 ENCOUNTER — NON-APPOINTMENT (OUTPATIENT)
Age: 33
End: 2023-09-20

## 2023-09-21 ENCOUNTER — APPOINTMENT (OUTPATIENT)
Dept: OBGYN | Facility: CLINIC | Age: 33
End: 2023-09-21
Payer: COMMERCIAL

## 2023-09-21 VITALS
SYSTOLIC BLOOD PRESSURE: 108 MMHG | WEIGHT: 157 LBS | HEIGHT: 60 IN | BODY MASS INDEX: 30.82 KG/M2 | DIASTOLIC BLOOD PRESSURE: 70 MMHG

## 2023-09-21 PROCEDURE — 76816 OB US FOLLOW-UP PER FETUS: CPT

## 2023-09-21 PROCEDURE — 0502F SUBSEQUENT PRENATAL CARE: CPT

## 2023-10-05 ENCOUNTER — OUTPATIENT (OUTPATIENT)
Dept: INPATIENT UNIT | Facility: HOSPITAL | Age: 33
LOS: 1 days | Discharge: HOME | End: 2023-10-05
Payer: COMMERCIAL

## 2023-10-05 ENCOUNTER — RESULT REVIEW (OUTPATIENT)
Age: 33
End: 2023-10-05

## 2023-10-05 ENCOUNTER — APPOINTMENT (OUTPATIENT)
Dept: OBGYN | Facility: CLINIC | Age: 33
End: 2023-10-05
Payer: COMMERCIAL

## 2023-10-05 VITALS
DIASTOLIC BLOOD PRESSURE: 60 MMHG | WEIGHT: 161 LBS | BODY MASS INDEX: 31.61 KG/M2 | HEIGHT: 60 IN | SYSTOLIC BLOOD PRESSURE: 106 MMHG

## 2023-10-05 DIAGNOSIS — O26.899 OTHER SPECIFIED PREGNANCY RELATED CONDITIONS, UNSPECIFIED TRIMESTER: ICD-10-CM

## 2023-10-05 DIAGNOSIS — Z3A.00 WEEKS OF GESTATION OF PREGNANCY NOT SPECIFIED: ICD-10-CM

## 2023-10-05 PROCEDURE — 76819 FETAL BIOPHYS PROFIL W/O NST: CPT | Mod: 26

## 2023-10-05 PROCEDURE — 99214 OFFICE O/P EST MOD 30 MIN: CPT

## 2023-10-05 PROCEDURE — 76815 OB US LIMITED FETUS(S): CPT | Mod: 26

## 2023-10-05 PROCEDURE — 76816 OB US FOLLOW-UP PER FETUS: CPT

## 2023-10-05 PROCEDURE — 76815 OB US LIMITED FETUS(S): CPT

## 2023-10-05 PROCEDURE — 59025 FETAL NON-STRESS TEST: CPT

## 2023-10-05 PROCEDURE — 0502F SUBSEQUENT PRENATAL CARE: CPT

## 2023-10-05 PROCEDURE — 76819 FETAL BIOPHYS PROFIL W/O NST: CPT

## 2023-10-05 PROCEDURE — 59025 FETAL NON-STRESS TEST: CPT | Mod: 26

## 2023-10-05 PROCEDURE — 99221 1ST HOSP IP/OBS SF/LOW 40: CPT | Mod: 25

## 2023-10-05 RX ORDER — OXYTOCIN 10 UNIT/ML
2 VIAL (ML) INJECTION
Qty: 30 | Refills: 0 | Status: DISCONTINUED | OUTPATIENT
Start: 2023-10-05 | End: 2023-10-05

## 2023-10-10 DIAGNOSIS — E28.2 POLYCYSTIC OVARIAN SYNDROME: ICD-10-CM

## 2023-10-10 DIAGNOSIS — O99.283 ENDOCRINE, NUTRITIONAL AND METABOLIC DISEASES COMPLICATING PREGNANCY, THIRD TRIMESTER: ICD-10-CM

## 2023-10-10 DIAGNOSIS — E03.9 HYPOTHYROIDISM, UNSPECIFIED: ICD-10-CM

## 2023-10-10 DIAGNOSIS — Z3A.33 33 WEEKS GESTATION OF PREGNANCY: ICD-10-CM

## 2023-10-10 DIAGNOSIS — O09.293 SUPERVISION OF PREGNANCY WITH OTHER POOR REPRODUCTIVE OR OBSTETRIC HISTORY, THIRD TRIMESTER: ICD-10-CM

## 2023-10-10 DIAGNOSIS — O36.8130 DECREASED FETAL MOVEMENTS, THIRD TRIMESTER, NOT APPLICABLE OR UNSPECIFIED: ICD-10-CM

## 2023-10-11 ENCOUNTER — NON-APPOINTMENT (OUTPATIENT)
Age: 33
End: 2023-10-11

## 2023-10-12 ENCOUNTER — APPOINTMENT (OUTPATIENT)
Dept: OBGYN | Facility: CLINIC | Age: 33
End: 2023-10-12
Payer: COMMERCIAL

## 2023-10-12 VITALS
WEIGHT: 159 LBS | DIASTOLIC BLOOD PRESSURE: 60 MMHG | HEIGHT: 60 IN | BODY MASS INDEX: 31.22 KG/M2 | SYSTOLIC BLOOD PRESSURE: 98 MMHG

## 2023-10-12 LAB
BILIRUB UR QL STRIP: NORMAL
CLARITY UR: CLEAR
COLLECTION METHOD: NORMAL
GLUCOSE UR-MCNC: NORMAL
HCG UR QL: 0.2 EU/DL
HGB UR QL STRIP.AUTO: NORMAL
KETONES UR-MCNC: NORMAL
LEUKOCYTE ESTERASE UR QL STRIP: NORMAL
NITRITE UR QL STRIP: NORMAL
PH UR STRIP: 6.5
PROT UR STRIP-MCNC: NORMAL
SP GR UR STRIP: 1.01

## 2023-10-12 PROCEDURE — 76816 OB US FOLLOW-UP PER FETUS: CPT

## 2023-10-12 PROCEDURE — 0502F SUBSEQUENT PRENATAL CARE: CPT

## 2023-10-12 PROCEDURE — 81003 URINALYSIS AUTO W/O SCOPE: CPT | Mod: NC,QW

## 2023-10-12 RX ORDER — VALACYCLOVIR 500 MG/1
500 TABLET, FILM COATED ORAL DAILY
Qty: 30 | Refills: 6 | Status: ACTIVE | COMMUNITY
Start: 2023-10-12 | End: 1900-01-01

## 2023-10-20 ENCOUNTER — APPOINTMENT (OUTPATIENT)
Dept: ANTEPARTUM | Facility: CLINIC | Age: 33
End: 2023-10-20
Payer: COMMERCIAL

## 2023-10-20 ENCOUNTER — ASOB RESULT (OUTPATIENT)
Age: 33
End: 2023-10-20

## 2023-10-20 PROCEDURE — 76816 OB US FOLLOW-UP PER FETUS: CPT

## 2023-10-20 PROCEDURE — 76819 FETAL BIOPHYS PROFIL W/O NST: CPT

## 2023-10-21 RX ORDER — LEVOTHYROXINE SODIUM 0.05 MG/1
50 TABLET ORAL DAILY
Qty: 90 | Refills: 0 | Status: ACTIVE | COMMUNITY
Start: 2022-09-08 | End: 1900-01-01

## 2023-10-23 ENCOUNTER — NON-APPOINTMENT (OUTPATIENT)
Age: 33
End: 2023-10-23

## 2023-10-26 ENCOUNTER — APPOINTMENT (OUTPATIENT)
Dept: OBGYN | Facility: CLINIC | Age: 33
End: 2023-10-26
Payer: COMMERCIAL

## 2023-10-26 ENCOUNTER — RESULT CHARGE (OUTPATIENT)
Age: 33
End: 2023-10-26

## 2023-10-26 VITALS
BODY MASS INDEX: 32.64 KG/M2 | SYSTOLIC BLOOD PRESSURE: 112 MMHG | HEIGHT: 60 IN | DIASTOLIC BLOOD PRESSURE: 60 MMHG | WEIGHT: 166.25 LBS

## 2023-10-26 DIAGNOSIS — Z34.93 ENCOUNTER FOR SUPERVISION OF NORMAL PREGNANCY, UNSPECIFIED, THIRD TRIMESTER: ICD-10-CM

## 2023-10-26 LAB
BILIRUB UR QL STRIP: NORMAL
CLARITY UR: CLEAR
COLLECTION METHOD: NORMAL
GLUCOSE UR-MCNC: NORMAL
HCG UR QL: 0.2 EU/DL
HGB UR QL STRIP.AUTO: NORMAL
KETONES UR-MCNC: NORMAL
LEUKOCYTE ESTERASE UR QL STRIP: NORMAL
NITRITE UR QL STRIP: NORMAL
PH UR STRIP: 7
PROT UR STRIP-MCNC: NORMAL
SP GR UR STRIP: 1.01

## 2023-10-26 PROCEDURE — 36415 COLL VENOUS BLD VENIPUNCTURE: CPT

## 2023-10-26 PROCEDURE — 81003 URINALYSIS AUTO W/O SCOPE: CPT | Mod: NC,QW

## 2023-10-26 PROCEDURE — 0502F SUBSEQUENT PRENATAL CARE: CPT

## 2023-11-02 ENCOUNTER — APPOINTMENT (OUTPATIENT)
Dept: OBGYN | Facility: CLINIC | Age: 33
End: 2023-11-02
Payer: COMMERCIAL

## 2023-11-02 VITALS
DIASTOLIC BLOOD PRESSURE: 60 MMHG | WEIGHT: 166 LBS | SYSTOLIC BLOOD PRESSURE: 110 MMHG | HEIGHT: 60 IN | BODY MASS INDEX: 32.59 KG/M2

## 2023-11-02 PROCEDURE — 0502F SUBSEQUENT PRENATAL CARE: CPT

## 2023-11-07 ENCOUNTER — APPOINTMENT (OUTPATIENT)
Dept: OBGYN | Facility: CLINIC | Age: 33
End: 2023-11-07
Payer: COMMERCIAL

## 2023-11-07 VITALS
SYSTOLIC BLOOD PRESSURE: 114 MMHG | DIASTOLIC BLOOD PRESSURE: 60 MMHG | WEIGHT: 167.13 LBS | BODY MASS INDEX: 32.81 KG/M2 | HEIGHT: 60 IN

## 2023-11-07 LAB
BILIRUB UR QL STRIP: NORMAL
CLARITY UR: NORMAL
COLLECTION METHOD: NORMAL
GLUCOSE UR-MCNC: NORMAL
HCG UR QL: 0.2 EU/DL
HGB UR QL STRIP.AUTO: NORMAL
KETONES UR-MCNC: NORMAL
LEUKOCYTE ESTERASE UR QL STRIP: NORMAL
NITRITE UR QL STRIP: NORMAL
PH UR STRIP: 7
PROT UR STRIP-MCNC: NORMAL
SP GR UR STRIP: 1.01

## 2023-11-07 PROCEDURE — 0502F SUBSEQUENT PRENATAL CARE: CPT

## 2023-11-07 PROCEDURE — 81003 URINALYSIS AUTO W/O SCOPE: CPT | Mod: NC,QW

## 2023-11-15 ENCOUNTER — APPOINTMENT (OUTPATIENT)
Dept: OBGYN | Facility: CLINIC | Age: 33
End: 2023-11-15
Payer: COMMERCIAL

## 2023-11-15 VITALS
WEIGHT: 169.25 LBS | SYSTOLIC BLOOD PRESSURE: 114 MMHG | BODY MASS INDEX: 33.23 KG/M2 | DIASTOLIC BLOOD PRESSURE: 68 MMHG | HEIGHT: 60 IN

## 2023-11-15 LAB
BASOPHILS # BLD AUTO: 0.04 K/UL
BASOPHILS NFR BLD AUTO: 0.5 %
BILIRUB UR QL STRIP: NORMAL
CLARITY UR: CLEAR
EOSINOPHIL # BLD AUTO: 0.12 K/UL
EOSINOPHIL NFR BLD AUTO: 1.4 %
GLUCOSE UR-MCNC: NORMAL
GP B STREP DNA SPEC QL NAA+PROBE: NOT DETECTED
HCG UR QL: 0.2 EU/DL
HCT VFR BLD CALC: 38.2 %
HGB BLD-MCNC: 12.4 G/DL
HGB UR QL STRIP.AUTO: NORMAL
HIV1+2 AB SPEC QL IA.RAPID: NONREACTIVE
IMM GRANULOCYTES NFR BLD AUTO: 1.1 %
KETONES UR-MCNC: NORMAL
LEUKOCYTE ESTERASE UR QL STRIP: NORMAL
LYMPHOCYTES # BLD AUTO: 1.9 K/UL
LYMPHOCYTES NFR BLD AUTO: 21.6 %
MAN DIFF?: NORMAL
MCHC RBC-ENTMCNC: 29.7 PG
MCHC RBC-ENTMCNC: 32.5 GM/DL
MCV RBC AUTO: 91.6 FL
MONOCYTES # BLD AUTO: 0.65 K/UL
MONOCYTES NFR BLD AUTO: 7.4 %
NEUTROPHILS # BLD AUTO: 5.98 K/UL
NEUTROPHILS NFR BLD AUTO: 68 %
NITRITE UR QL STRIP: NORMAL
PH UR STRIP: 7
PLATELET # BLD AUTO: 244 K/UL
PROT UR STRIP-MCNC: NORMAL
RBC # BLD: 4.17 M/UL
RBC # FLD: 14.2 %
SOURCE GBS: NORMAL
SP GR UR STRIP: 1.01
TSH SERPL-ACNC: 0.64 UIU/ML
WBC # FLD AUTO: 8.79 K/UL

## 2023-11-15 PROCEDURE — 76816 OB US FOLLOW-UP PER FETUS: CPT

## 2023-11-15 PROCEDURE — 0502F SUBSEQUENT PRENATAL CARE: CPT

## 2023-11-15 PROCEDURE — 81003 URINALYSIS AUTO W/O SCOPE: CPT | Mod: NC,QW

## 2023-11-16 ENCOUNTER — INPATIENT (INPATIENT)
Facility: HOSPITAL | Age: 33
LOS: 3 days | Discharge: HOME | End: 2023-11-20
Attending: OBSTETRICS & GYNECOLOGY | Admitting: OBSTETRICS & GYNECOLOGY
Payer: COMMERCIAL

## 2023-11-16 VITALS — HEIGHT: 61 IN | WEIGHT: 169.09 LBS

## 2023-11-16 DIAGNOSIS — Z34.90 ENCOUNTER FOR SUPERVISION OF NORMAL PREGNANCY, UNSPECIFIED, UNSPECIFIED TRIMESTER: ICD-10-CM

## 2023-11-16 LAB
BASOPHILS # BLD AUTO: 0.05 K/UL — SIGNIFICANT CHANGE UP (ref 0–0.2)
BASOPHILS # BLD AUTO: 0.05 K/UL — SIGNIFICANT CHANGE UP (ref 0–0.2)
BASOPHILS NFR BLD AUTO: 0.5 % — SIGNIFICANT CHANGE UP (ref 0–2)
BASOPHILS NFR BLD AUTO: 0.5 % — SIGNIFICANT CHANGE UP (ref 0–2)
EOSINOPHIL # BLD AUTO: 0.11 K/UL — SIGNIFICANT CHANGE UP (ref 0–0.5)
EOSINOPHIL # BLD AUTO: 0.11 K/UL — SIGNIFICANT CHANGE UP (ref 0–0.5)
EOSINOPHIL NFR BLD AUTO: 1.2 % — SIGNIFICANT CHANGE UP (ref 0–6)
EOSINOPHIL NFR BLD AUTO: 1.2 % — SIGNIFICANT CHANGE UP (ref 0–6)
HCT VFR BLD CALC: 38.5 % — SIGNIFICANT CHANGE UP (ref 34.5–45)
HCT VFR BLD CALC: 38.5 % — SIGNIFICANT CHANGE UP (ref 34.5–45)
HGB BLD-MCNC: 13.4 G/DL — SIGNIFICANT CHANGE UP (ref 11.5–15.5)
HGB BLD-MCNC: 13.4 G/DL — SIGNIFICANT CHANGE UP (ref 11.5–15.5)
IMM GRANULOCYTES NFR BLD AUTO: 0.9 % — SIGNIFICANT CHANGE UP (ref 0–0.9)
IMM GRANULOCYTES NFR BLD AUTO: 0.9 % — SIGNIFICANT CHANGE UP (ref 0–0.9)
LYMPHOCYTES # BLD AUTO: 2.4 K/UL — SIGNIFICANT CHANGE UP (ref 1–3.3)
LYMPHOCYTES # BLD AUTO: 2.4 K/UL — SIGNIFICANT CHANGE UP (ref 1–3.3)
LYMPHOCYTES # BLD AUTO: 25.2 % — SIGNIFICANT CHANGE UP (ref 13–44)
LYMPHOCYTES # BLD AUTO: 25.2 % — SIGNIFICANT CHANGE UP (ref 13–44)
MCHC RBC-ENTMCNC: 30.6 PG — SIGNIFICANT CHANGE UP (ref 27–34)
MCHC RBC-ENTMCNC: 30.6 PG — SIGNIFICANT CHANGE UP (ref 27–34)
MCHC RBC-ENTMCNC: 34.8 GM/DL — SIGNIFICANT CHANGE UP (ref 32–36)
MCHC RBC-ENTMCNC: 34.8 GM/DL — SIGNIFICANT CHANGE UP (ref 32–36)
MCV RBC AUTO: 87.9 FL — SIGNIFICANT CHANGE UP (ref 80–100)
MCV RBC AUTO: 87.9 FL — SIGNIFICANT CHANGE UP (ref 80–100)
MONOCYTES # BLD AUTO: 0.74 K/UL — SIGNIFICANT CHANGE UP (ref 0–0.9)
MONOCYTES # BLD AUTO: 0.74 K/UL — SIGNIFICANT CHANGE UP (ref 0–0.9)
MONOCYTES NFR BLD AUTO: 7.8 % — SIGNIFICANT CHANGE UP (ref 2–14)
MONOCYTES NFR BLD AUTO: 7.8 % — SIGNIFICANT CHANGE UP (ref 2–14)
NEUTROPHILS # BLD AUTO: 6.13 K/UL — SIGNIFICANT CHANGE UP (ref 1.8–7.4)
NEUTROPHILS # BLD AUTO: 6.13 K/UL — SIGNIFICANT CHANGE UP (ref 1.8–7.4)
NEUTROPHILS NFR BLD AUTO: 64.4 % — SIGNIFICANT CHANGE UP (ref 43–77)
NEUTROPHILS NFR BLD AUTO: 64.4 % — SIGNIFICANT CHANGE UP (ref 43–77)
PLATELET # BLD AUTO: 201 K/UL — SIGNIFICANT CHANGE UP (ref 150–400)
PLATELET # BLD AUTO: 201 K/UL — SIGNIFICANT CHANGE UP (ref 150–400)
RBC # BLD: 4.38 M/UL — SIGNIFICANT CHANGE UP (ref 3.8–5.2)
RBC # BLD: 4.38 M/UL — SIGNIFICANT CHANGE UP (ref 3.8–5.2)
RBC # FLD: 13.6 % — SIGNIFICANT CHANGE UP (ref 10.3–14.5)
RBC # FLD: 13.6 % — SIGNIFICANT CHANGE UP (ref 10.3–14.5)
WBC # BLD: 9.52 K/UL — SIGNIFICANT CHANGE UP (ref 3.8–10.5)
WBC # BLD: 9.52 K/UL — SIGNIFICANT CHANGE UP (ref 3.8–10.5)
WBC # FLD AUTO: 9.52 K/UL — SIGNIFICANT CHANGE UP (ref 3.8–10.5)
WBC # FLD AUTO: 9.52 K/UL — SIGNIFICANT CHANGE UP (ref 3.8–10.5)

## 2023-11-16 PROCEDURE — 86900 BLOOD TYPING SEROLOGIC ABO: CPT

## 2023-11-16 PROCEDURE — 86850 RBC ANTIBODY SCREEN: CPT

## 2023-11-16 PROCEDURE — 86780 TREPONEMA PALLIDUM: CPT

## 2023-11-16 PROCEDURE — 94760 N-INVAS EAR/PLS OXIMETRY 1: CPT

## 2023-11-16 PROCEDURE — C1889: CPT

## 2023-11-16 PROCEDURE — 59050 FETAL MONITOR W/REPORT: CPT

## 2023-11-16 PROCEDURE — 85025 COMPLETE CBC W/AUTO DIFF WBC: CPT

## 2023-11-16 PROCEDURE — 86901 BLOOD TYPING SEROLOGIC RH(D): CPT

## 2023-11-16 PROCEDURE — 36415 COLL VENOUS BLD VENIPUNCTURE: CPT

## 2023-11-16 RX ORDER — LEVOTHYROXINE SODIUM 125 MCG
50 TABLET ORAL DAILY
Refills: 0 | Status: DISCONTINUED | OUTPATIENT
Start: 2023-11-16 | End: 2023-11-20

## 2023-11-16 RX ORDER — CITRIC ACID/SODIUM CITRATE 300-500 MG
30 SOLUTION, ORAL ORAL ONCE
Refills: 0 | Status: DISCONTINUED | OUTPATIENT
Start: 2023-11-16 | End: 2023-11-18

## 2023-11-16 RX ORDER — CHLORHEXIDINE GLUCONATE 213 G/1000ML
1 SOLUTION TOPICAL DAILY
Refills: 0 | Status: DISCONTINUED | OUTPATIENT
Start: 2023-11-16 | End: 2023-11-18

## 2023-11-16 RX ORDER — OXYTOCIN 10 UNIT/ML
333.33 VIAL (ML) INJECTION
Qty: 20 | Refills: 0 | Status: COMPLETED | OUTPATIENT
Start: 2023-11-16 | End: 2023-11-17

## 2023-11-16 RX ORDER — SODIUM CHLORIDE 9 MG/ML
1000 INJECTION, SOLUTION INTRAVENOUS
Refills: 0 | Status: DISCONTINUED | OUTPATIENT
Start: 2023-11-16 | End: 2023-11-18

## 2023-11-16 NOTE — PATIENT PROFILE OB - FALL HARM RISK - UNIVERSAL INTERVENTIONS
Bed in lowest position, wheels locked, appropriate side rails in place/Call bell, personal items and telephone in reach/Instruct patient to call for assistance before getting out of bed or chair/Non-slip footwear when patient is out of bed/[place] to call system/Physically safe environment - no spills, clutter or unnecessary equipment/Purposeful Proactive Rounding/Room/bathroom lighting operational, light cord in reach

## 2023-11-17 ENCOUNTER — APPOINTMENT (OUTPATIENT)
Dept: ANTEPARTUM | Facility: CLINIC | Age: 33
End: 2023-11-17

## 2023-11-17 LAB
T PALLIDUM AB TITR SER: NEGATIVE — SIGNIFICANT CHANGE UP
T PALLIDUM AB TITR SER: NEGATIVE — SIGNIFICANT CHANGE UP

## 2023-11-17 RX ORDER — VALACYCLOVIR 500 MG/1
500 TABLET, FILM COATED ORAL
Refills: 0 | Status: DISCONTINUED | OUTPATIENT
Start: 2023-11-17 | End: 2023-11-18

## 2023-11-17 RX ORDER — OXYTOCIN 10 UNIT/ML
2 VIAL (ML) INJECTION
Qty: 30 | Refills: 0 | Status: DISCONTINUED | OUTPATIENT
Start: 2023-11-17 | End: 2023-11-20

## 2023-11-17 RX ADMIN — Medication 50 MICROGRAM(S): at 07:40

## 2023-11-17 RX ADMIN — VALACYCLOVIR 500 MILLIGRAM(S): 500 TABLET, FILM COATED ORAL at 08:59

## 2023-11-17 RX ADMIN — Medication 2 MILLIUNIT(S)/MIN: at 08:19

## 2023-11-17 RX ADMIN — SODIUM CHLORIDE 125 MILLILITER(S): 9 INJECTION, SOLUTION INTRAVENOUS at 12:21

## 2023-11-17 RX ADMIN — SODIUM CHLORIDE 125 MILLILITER(S): 9 INJECTION, SOLUTION INTRAVENOUS at 08:20

## 2023-11-18 PROCEDURE — 59400 OBSTETRICAL CARE: CPT

## 2023-11-18 RX ORDER — AER TRAVELER 0.5 G/1
1 SOLUTION RECTAL; TOPICAL EVERY 4 HOURS
Refills: 0 | Status: DISCONTINUED | OUTPATIENT
Start: 2023-11-18 | End: 2023-11-20

## 2023-11-18 RX ORDER — OXYCODONE HYDROCHLORIDE 5 MG/1
5 TABLET ORAL
Refills: 0 | Status: DISCONTINUED | OUTPATIENT
Start: 2023-11-18 | End: 2023-11-20

## 2023-11-18 RX ORDER — LANOLIN
1 OINTMENT (GRAM) TOPICAL EVERY 6 HOURS
Refills: 0 | Status: DISCONTINUED | OUTPATIENT
Start: 2023-11-18 | End: 2023-11-20

## 2023-11-18 RX ORDER — AMPICILLIN TRIHYDRATE 250 MG
2 CAPSULE ORAL EVERY 6 HOURS
Refills: 0 | Status: DISCONTINUED | OUTPATIENT
Start: 2023-11-18 | End: 2023-11-18

## 2023-11-18 RX ORDER — OXYTOCIN 10 UNIT/ML
41.67 VIAL (ML) INJECTION
Qty: 20 | Refills: 0 | Status: DISCONTINUED | OUTPATIENT
Start: 2023-11-18 | End: 2023-11-20

## 2023-11-18 RX ORDER — OXYCODONE HYDROCHLORIDE 5 MG/1
5 TABLET ORAL ONCE
Refills: 0 | Status: DISCONTINUED | OUTPATIENT
Start: 2023-11-18 | End: 2023-11-20

## 2023-11-18 RX ORDER — DIBUCAINE 1 %
1 OINTMENT (GRAM) RECTAL EVERY 6 HOURS
Refills: 0 | Status: DISCONTINUED | OUTPATIENT
Start: 2023-11-18 | End: 2023-11-20

## 2023-11-18 RX ORDER — TETANUS TOXOID, REDUCED DIPHTHERIA TOXOID AND ACELLULAR PERTUSSIS VACCINE, ADSORBED 5; 2.5; 8; 8; 2.5 [IU]/.5ML; [IU]/.5ML; UG/.5ML; UG/.5ML; UG/.5ML
0.5 SUSPENSION INTRAMUSCULAR ONCE
Refills: 0 | Status: DISCONTINUED | OUTPATIENT
Start: 2023-11-18 | End: 2023-11-20

## 2023-11-18 RX ORDER — ACETAMINOPHEN 500 MG
975 TABLET ORAL
Refills: 0 | Status: DISCONTINUED | OUTPATIENT
Start: 2023-11-18 | End: 2023-11-20

## 2023-11-18 RX ORDER — PRAMOXINE HYDROCHLORIDE 150 MG/15G
1 AEROSOL, FOAM RECTAL EVERY 4 HOURS
Refills: 0 | Status: DISCONTINUED | OUTPATIENT
Start: 2023-11-18 | End: 2023-11-20

## 2023-11-18 RX ORDER — HYDROCORTISONE 1 %
1 OINTMENT (GRAM) TOPICAL EVERY 6 HOURS
Refills: 0 | Status: DISCONTINUED | OUTPATIENT
Start: 2023-11-18 | End: 2023-11-20

## 2023-11-18 RX ORDER — BENZOCAINE 10 %
1 GEL (GRAM) MUCOUS MEMBRANE EVERY 6 HOURS
Refills: 0 | Status: DISCONTINUED | OUTPATIENT
Start: 2023-11-18 | End: 2023-11-20

## 2023-11-18 RX ORDER — DIPHENHYDRAMINE HCL 50 MG
25 CAPSULE ORAL EVERY 6 HOURS
Refills: 0 | Status: DISCONTINUED | OUTPATIENT
Start: 2023-11-18 | End: 2023-11-20

## 2023-11-18 RX ORDER — DIPHENHYDRAMINE HCL 50 MG
25 CAPSULE ORAL ONCE
Refills: 0 | Status: COMPLETED | OUTPATIENT
Start: 2023-11-18 | End: 2023-11-18

## 2023-11-18 RX ORDER — IBUPROFEN 200 MG
600 TABLET ORAL EVERY 6 HOURS
Refills: 0 | Status: DISCONTINUED | OUTPATIENT
Start: 2023-11-18 | End: 2023-11-20

## 2023-11-18 RX ORDER — SIMETHICONE 80 MG/1
80 TABLET, CHEWABLE ORAL EVERY 4 HOURS
Refills: 0 | Status: DISCONTINUED | OUTPATIENT
Start: 2023-11-18 | End: 2023-11-20

## 2023-11-18 RX ORDER — MAGNESIUM HYDROXIDE 400 MG/1
30 TABLET, CHEWABLE ORAL
Refills: 0 | Status: DISCONTINUED | OUTPATIENT
Start: 2023-11-18 | End: 2023-11-20

## 2023-11-18 RX ORDER — KETOROLAC TROMETHAMINE 30 MG/ML
30 SYRINGE (ML) INJECTION ONCE
Refills: 0 | Status: DISCONTINUED | OUTPATIENT
Start: 2023-11-18 | End: 2023-11-18

## 2023-11-18 RX ORDER — IBUPROFEN 200 MG
600 TABLET ORAL EVERY 6 HOURS
Refills: 0 | Status: COMPLETED | OUTPATIENT
Start: 2023-11-18 | End: 2024-10-16

## 2023-11-18 RX ORDER — GENTAMICIN SULFATE 40 MG/ML
240 VIAL (ML) INJECTION ONCE
Refills: 0 | Status: DISCONTINUED | OUTPATIENT
Start: 2023-11-18 | End: 2023-11-18

## 2023-11-18 RX ORDER — ACETAMINOPHEN 500 MG
1000 TABLET ORAL ONCE
Refills: 0 | Status: COMPLETED | OUTPATIENT
Start: 2023-11-18 | End: 2023-11-18

## 2023-11-18 RX ORDER — SODIUM CHLORIDE 9 MG/ML
3 INJECTION INTRAMUSCULAR; INTRAVENOUS; SUBCUTANEOUS EVERY 8 HOURS
Refills: 0 | Status: DISCONTINUED | OUTPATIENT
Start: 2023-11-18 | End: 2023-11-19

## 2023-11-18 RX ADMIN — Medication 30 MILLIGRAM(S): at 12:00

## 2023-11-18 RX ADMIN — Medication 975 MILLIGRAM(S): at 15:49

## 2023-11-18 RX ADMIN — Medication 600 MILLIGRAM(S): at 18:52

## 2023-11-18 RX ADMIN — Medication 100 MILLIGRAM(S): at 04:30

## 2023-11-18 RX ADMIN — Medication 975 MILLIGRAM(S): at 21:09

## 2023-11-18 RX ADMIN — Medication 1000 MILLIGRAM(S): at 04:33

## 2023-11-18 RX ADMIN — Medication 1000 MILLIUNIT(S)/MIN: at 10:42

## 2023-11-18 RX ADMIN — Medication 30 MILLIGRAM(S): at 11:50

## 2023-11-18 RX ADMIN — Medication 1 SPRAY(S): at 18:54

## 2023-11-18 RX ADMIN — Medication 1 APPLICATION(S): at 18:54

## 2023-11-18 RX ADMIN — AER TRAVELER 1 APPLICATION(S): 0.5 SOLUTION RECTAL; TOPICAL at 18:54

## 2023-11-18 RX ADMIN — Medication 600 MILLIGRAM(S): at 19:22

## 2023-11-18 RX ADMIN — Medication 25 MILLIGRAM(S): at 03:59

## 2023-11-18 RX ADMIN — Medication 216 GRAM(S): at 03:38

## 2023-11-18 RX ADMIN — Medication 975 MILLIGRAM(S): at 16:30

## 2023-11-18 RX ADMIN — Medication 400 MILLIGRAM(S): at 03:25

## 2023-11-19 ENCOUNTER — TRANSCRIPTION ENCOUNTER (OUTPATIENT)
Age: 33
End: 2023-11-19

## 2023-11-19 LAB
BASOPHILS # BLD AUTO: 0.07 K/UL — SIGNIFICANT CHANGE UP (ref 0–0.2)
BASOPHILS # BLD AUTO: 0.07 K/UL — SIGNIFICANT CHANGE UP (ref 0–0.2)
BASOPHILS NFR BLD AUTO: 0.5 % — SIGNIFICANT CHANGE UP (ref 0–2)
BASOPHILS NFR BLD AUTO: 0.5 % — SIGNIFICANT CHANGE UP (ref 0–2)
EOSINOPHIL # BLD AUTO: 0.17 K/UL — SIGNIFICANT CHANGE UP (ref 0–0.5)
EOSINOPHIL # BLD AUTO: 0.17 K/UL — SIGNIFICANT CHANGE UP (ref 0–0.5)
EOSINOPHIL NFR BLD AUTO: 1.1 % — SIGNIFICANT CHANGE UP (ref 0–6)
EOSINOPHIL NFR BLD AUTO: 1.1 % — SIGNIFICANT CHANGE UP (ref 0–6)
HCT VFR BLD CALC: 33.8 % — LOW (ref 34.5–45)
HCT VFR BLD CALC: 33.8 % — LOW (ref 34.5–45)
HGB BLD-MCNC: 11.4 G/DL — LOW (ref 11.5–15.5)
HGB BLD-MCNC: 11.4 G/DL — LOW (ref 11.5–15.5)
IMM GRANULOCYTES NFR BLD AUTO: 0.9 % — SIGNIFICANT CHANGE UP (ref 0–0.9)
IMM GRANULOCYTES NFR BLD AUTO: 0.9 % — SIGNIFICANT CHANGE UP (ref 0–0.9)
LYMPHOCYTES # BLD AUTO: 16.3 % — SIGNIFICANT CHANGE UP (ref 13–44)
LYMPHOCYTES # BLD AUTO: 16.3 % — SIGNIFICANT CHANGE UP (ref 13–44)
LYMPHOCYTES # BLD AUTO: 2.47 K/UL — SIGNIFICANT CHANGE UP (ref 1–3.3)
LYMPHOCYTES # BLD AUTO: 2.47 K/UL — SIGNIFICANT CHANGE UP (ref 1–3.3)
MCHC RBC-ENTMCNC: 30.6 PG — SIGNIFICANT CHANGE UP (ref 27–34)
MCHC RBC-ENTMCNC: 30.6 PG — SIGNIFICANT CHANGE UP (ref 27–34)
MCHC RBC-ENTMCNC: 33.7 GM/DL — SIGNIFICANT CHANGE UP (ref 32–36)
MCHC RBC-ENTMCNC: 33.7 GM/DL — SIGNIFICANT CHANGE UP (ref 32–36)
MCV RBC AUTO: 90.6 FL — SIGNIFICANT CHANGE UP (ref 80–100)
MCV RBC AUTO: 90.6 FL — SIGNIFICANT CHANGE UP (ref 80–100)
MONOCYTES # BLD AUTO: 0.95 K/UL — HIGH (ref 0–0.9)
MONOCYTES # BLD AUTO: 0.95 K/UL — HIGH (ref 0–0.9)
MONOCYTES NFR BLD AUTO: 6.3 % — SIGNIFICANT CHANGE UP (ref 2–14)
MONOCYTES NFR BLD AUTO: 6.3 % — SIGNIFICANT CHANGE UP (ref 2–14)
NEUTROPHILS # BLD AUTO: 11.36 K/UL — HIGH (ref 1.8–7.4)
NEUTROPHILS # BLD AUTO: 11.36 K/UL — HIGH (ref 1.8–7.4)
NEUTROPHILS NFR BLD AUTO: 74.9 % — SIGNIFICANT CHANGE UP (ref 43–77)
NEUTROPHILS NFR BLD AUTO: 74.9 % — SIGNIFICANT CHANGE UP (ref 43–77)
PLATELET # BLD AUTO: 155 K/UL — SIGNIFICANT CHANGE UP (ref 150–400)
PLATELET # BLD AUTO: 155 K/UL — SIGNIFICANT CHANGE UP (ref 150–400)
RBC # BLD: 3.73 M/UL — LOW (ref 3.8–5.2)
RBC # BLD: 3.73 M/UL — LOW (ref 3.8–5.2)
RBC # FLD: 14 % — SIGNIFICANT CHANGE UP (ref 10.3–14.5)
RBC # FLD: 14 % — SIGNIFICANT CHANGE UP (ref 10.3–14.5)
WBC # BLD: 15.15 K/UL — HIGH (ref 3.8–10.5)
WBC # BLD: 15.15 K/UL — HIGH (ref 3.8–10.5)
WBC # FLD AUTO: 15.15 K/UL — HIGH (ref 3.8–10.5)
WBC # FLD AUTO: 15.15 K/UL — HIGH (ref 3.8–10.5)

## 2023-11-19 RX ORDER — IBUPROFEN 200 MG
1 TABLET ORAL
Qty: 0 | Refills: 0 | DISCHARGE
Start: 2023-11-19

## 2023-11-19 RX ORDER — ACETAMINOPHEN 500 MG
3 TABLET ORAL
Qty: 0 | Refills: 0 | DISCHARGE
Start: 2023-11-19

## 2023-11-19 RX ADMIN — Medication 600 MILLIGRAM(S): at 00:10

## 2023-11-19 RX ADMIN — Medication 975 MILLIGRAM(S): at 22:10

## 2023-11-19 RX ADMIN — Medication 975 MILLIGRAM(S): at 23:05

## 2023-11-19 RX ADMIN — Medication 975 MILLIGRAM(S): at 10:50

## 2023-11-19 RX ADMIN — Medication 975 MILLIGRAM(S): at 03:36

## 2023-11-19 RX ADMIN — Medication 50 MICROGRAM(S): at 06:05

## 2023-11-19 RX ADMIN — Medication 600 MILLIGRAM(S): at 06:05

## 2023-11-19 RX ADMIN — Medication 975 MILLIGRAM(S): at 09:50

## 2023-11-19 RX ADMIN — Medication 1 TABLET(S): at 09:51

## 2023-11-19 RX ADMIN — Medication 600 MILLIGRAM(S): at 13:27

## 2023-11-19 RX ADMIN — Medication 600 MILLIGRAM(S): at 19:06

## 2023-11-19 RX ADMIN — Medication 600 MILLIGRAM(S): at 14:20

## 2023-11-19 RX ADMIN — Medication 975 MILLIGRAM(S): at 17:10

## 2023-11-19 RX ADMIN — Medication 975 MILLIGRAM(S): at 16:14

## 2023-11-19 NOTE — DISCHARGE NOTE OB - CARE PROVIDER_API CALL
Vonnie Daley  Obstetrics and Gynecology  [address] [place] [address]  Phone: [phone]  Fax: [phone]  Follow Up Time:

## 2023-11-19 NOTE — DISCHARGE NOTE OB - HOSPITAL COURSE
Patient underwent a normal spontaneous vaginal delivery @ 39 W 2 D. Intrapartum valtrex was administered to prevent active herpetic lesions. Post partum course was complicated by chorio requiring abx. Currently pt is asymptomatic. Pain is well controlled with PRN medication. She has no difficulty with ambulation, voiding, or PO intake. Lab values and vital signs are within normal limits prior to discharge.

## 2023-11-19 NOTE — PROGRESS NOTE ADULT - SUBJECTIVE AND OBJECTIVE BOX
CALLI DILL is a 33y  now PPD1 s/p spontaneous vaginal delivery at 39 weeks 2 day gestation complicated by chorio requiring Amp s/p allergic rxn s/p switch to clindamycin. Pt had been on valtrex to prevent genital herpes. Since Pt would like to breastfeed, she wants to take valtrex in case she has herpes breakout.      S:    The patient has no complaints.  Pain controlled with current treatment regimen.   She is ambulating without difficulty and tolerating PO.   + flatus/-BM/+ voiding   She endorses appropriate lochia, which is decreasing.   She is breastfeeding    O:    T(C): 36.3 (23 @ 20:56), Max: 37 (23 @ 11:00)  HR: 64 (23 @ 20:56) (42 - 85)  BP: 92/56 (23 @ 20:56) (92/56 - 130/66)  RR: 16 (23 @ 20:56) (16 - 17)  SpO2: 98% (23 @ 20:56) (97% - 98%)    Gen: NAD, AOx3  CV: RRR  Pulm: CTABL  Breast: Nontender, non-engorged   Abdomen:  Soft, non-tender, non-distended, +bowel sounds  Uterus:  Fundus firm below umbilicus  VE:  +Lochia  Ext:  Non-tender and non-edematous

## 2023-11-19 NOTE — DISCHARGE NOTE OB - CRACKED, BLEEDING NIPPLES
Explained to patient that Dr. Ibarra would prefer she holds Femara 2 weeks prior to surgery to help with wound healing after surgery. Two post-op appointments made with patient. Patient expressed understanding, agreed with plan of care, and was appreciative of telephone call.    
Statement Selected

## 2023-11-19 NOTE — PROGRESS NOTE ADULT - ASSESSMENT
A/P:    -Vital signs stable  -Hgb: 13.4-> AM labs pending   -Voiding, tolerating PO, bowel function nml ---> Pt would like stool softener since she has hemorrhoids  -Advance care as tolerated   -Continue routine postpartum care and education  -Healthy infant  -Dispo: Pt is PPD 1. c/w PP care     Case d/w Dr. Araiza

## 2023-11-19 NOTE — DISCHARGE NOTE OB - MEDICATION SUMMARY - MEDICATIONS TO TAKE
I will START or STAY ON the medications listed below when I get home from the hospital:    ibuprofen 600 mg oral tablet  -- 1 tab(s) by mouth every 6 hours  -- Indication: For Encounter for supervision of normal pregnancy, antepartum    acetaminophen 325 mg oral tablet  -- 3 tab(s) by mouth 3 times a day as needed for pain  -- Indication: For Encounter for supervision of normal pregnancy, antepartum    Prenatal Multivitamins with Folic Acid 1 mg oral tablet  -- 1 tab(s) by mouth once a day  -- Indication: For Encounter for supervision of normal pregnancy, antepartum    levothyroxine 50 mcg (0.05 mg) oral tablet  -- 1 tab(s) by mouth once a day  -- Indication: For Encounter for supervision of normal pregnancy, antepartum

## 2023-11-19 NOTE — DISCHARGE NOTE OB - PATIENT PORTAL LINK FT
You can access the FollowMyHealth Patient Portal offered by [facility] by registering at the following website: [email or web address]. By joining [facility]’s FollowMyHealth portal, you will also be able to view your health information using other applications (apps) compatible with our system.

## 2023-11-20 VITALS
RESPIRATION RATE: 16 BRPM | OXYGEN SATURATION: 98 % | SYSTOLIC BLOOD PRESSURE: 114 MMHG | DIASTOLIC BLOOD PRESSURE: 72 MMHG | HEART RATE: 60 BPM | TEMPERATURE: 98 F

## 2023-11-20 RX ADMIN — Medication 975 MILLIGRAM(S): at 08:34

## 2023-11-20 RX ADMIN — Medication 600 MILLIGRAM(S): at 06:15

## 2023-11-20 RX ADMIN — Medication 975 MILLIGRAM(S): at 03:16

## 2023-11-20 RX ADMIN — Medication 600 MILLIGRAM(S): at 00:47

## 2023-11-20 RX ADMIN — Medication 600 MILLIGRAM(S): at 07:10

## 2023-11-20 RX ADMIN — Medication 975 MILLIGRAM(S): at 04:15

## 2023-11-20 RX ADMIN — Medication 600 MILLIGRAM(S): at 01:45

## 2023-11-20 RX ADMIN — Medication 50 MICROGRAM(S): at 06:15

## 2023-11-20 NOTE — PROGRESS NOTE ADULT - ATTENDING COMMENTS
33y  now PPD1 s/p spontaneous vaginal delivery at 39 weeks 2 day gestation complicated by chorio requiring Amp s/p allergic rxn s/p switch to clindamycin. Pt had been on valtrex to prevent genital herpes.    The patient has no complaints.  Pain controlled with current treatment regimen.   She is ambulating without difficulty and tolerating PO.   + flatus/-BM/+ voiding   She endorses appropriate lochia, which is decreasing.   She is breastfeeding    A/P:    -Vital signs stable  -Hgb: 13.4-> AM labs pending   -Voiding, tolerating PO, bowel function nml ---> Pt would like stool softener since she has hemorrhoids  -Advance care as tolerated   -Continue routine postpartum care and education  -Healthy infant  -Dispo: Pt is PPD 1. c/w PP care
Agree with assessment and plan as above.
no

## 2023-11-20 NOTE — PROGRESS NOTE ADULT - SUBJECTIVE AND OBJECTIVE BOX
CALLI DILL is a 33y  now PPD2 s/p spontaneous vaginal delivery at 39 weeks 2 day gestation complicated by chorio requiring Amp s/p allergic rxn s/p switch to clindamycin. Pt had been on valtrex to prevent genital herpes. Since Pt would like to breastfeed, she wants to take valtrex in case she has herpes breakout.        S:    The patient has no complaints.  Pain controlled with current treatment regimen.   She is ambulating without difficulty and tolerating PO.   + flatus/-BM/+ voiding   She endorses appropriate lochia, which is decreasing.   She is breastfeeding    O:    T(C): 36.3 (23 @ 20:45), Max: 36.4 (23 @ 09:45)  HR: 69 (23 @ 20:45) (59 - 69)  BP: 106/65 (23 @ 20:45) (94/65 - 106/65)  RR: 16 (23 @ 20:45) (16 - 17)  SpO2: 98% (23 @ 20:45) (98% - 99%)    Gen: NAD, AOx3  CV: RRR  Pulm: CTABL  Breast: Nontender, non-engorged   Abdomen:  Soft, non-tender, non-distended, +bowel sounds  Uterus:  Fundus firm below umbilicus  VE:  +Lochia  Ext:  Non-tender and non-edematous                          11.4   15.15 )-----------( 155      ( 2023 08:52 )             33.8             A/P:    -Vital signs stable  -Hgb: 13.4-> 11.4  -Voiding, tolerating PO, bowel function nml ---> Pt would like stool softener since she has hemorrhoids  -Advance care as tolerated   -Continue routine postpartum care and education  -Healthy infant  -Dispo: Pt is PPD 2. c/w PP care  CALLI DILL is a 33y  now PPD2 s/p spontaneous vaginal delivery at 39 weeks 2 day gestation complicated by chorio requiring Amp s/p allergic rxn s/p switch to clindamycin. Pt had been on valtrex to prevent genital herpes. Since Pt would like to breastfeed, she wants to take valtrex in case she has herpes breakout.        S:    The patient has no complaints.  Pain controlled with current treatment regimen.   She is ambulating without difficulty and tolerating PO.   + flatus/-BM/+ voiding   She endorses appropriate lochia, which is decreasing.   She is breastfeeding    O:    T(C): 36.3 (23 @ 20:45), Max: 36.4 (23 @ 09:45)  HR: 69 (23 @ 20:45) (59 - 69)  BP: 106/65 (23 @ 20:45) (94/65 - 106/65)  RR: 16 (23 @ 20:45) (16 - 17)  SpO2: 98% (23 @ 20:45) (98% - 99%)    Gen: NAD, AOx3  CV: RRR  Pulm: CTABL  Breast: Nontender, non-engorged   Abdomen:  Soft, non-tender, non-distended, +bowel sounds  Uterus:  Fundus firm below umbilicus  VE:  +Lochia  Ext:  Non-tender and non-edematous                          11.4   15.15 )-----------( 155      ( 2023 08:52 )             33.8             A/P:    -Vital signs stable  -Hgb: 13.4-> 11.4  -Voiding, tolerating PO, bowel function nml ---> Pt would like stool softener since she has hemorrhoids, will take colace after discharge  -Advance care as tolerated   -Continue routine postpartum care and education  -Healthy infant  -Dispo: Pt is PPD 2. c/w PP care

## 2023-11-24 DIAGNOSIS — O41.1230 CHORIOAMNIONITIS, THIRD TRIMESTER, NOT APPLICABLE OR UNSPECIFIED: ICD-10-CM

## 2023-11-24 DIAGNOSIS — Z3A.39 39 WEEKS GESTATION OF PREGNANCY: ICD-10-CM

## 2023-11-24 DIAGNOSIS — Z79.890 HORMONE REPLACEMENT THERAPY: ICD-10-CM

## 2023-11-24 DIAGNOSIS — A60.00 HERPESVIRAL INFECTION OF UROGENITAL SYSTEM, UNSPECIFIED: ICD-10-CM

## 2023-11-24 DIAGNOSIS — Z88.2 ALLERGY STATUS TO SULFONAMIDES: ICD-10-CM

## 2023-11-24 DIAGNOSIS — Z88.1 ALLERGY STATUS TO OTHER ANTIBIOTIC AGENTS STATUS: ICD-10-CM

## 2023-11-24 DIAGNOSIS — Z28.09 IMMUNIZATION NOT CARRIED OUT BECAUSE OF OTHER CONTRAINDICATION: ICD-10-CM

## 2023-11-24 DIAGNOSIS — E03.9 HYPOTHYROIDISM, UNSPECIFIED: ICD-10-CM

## 2023-12-28 ENCOUNTER — APPOINTMENT (OUTPATIENT)
Dept: OBGYN | Facility: CLINIC | Age: 33
End: 2023-12-28
Payer: COMMERCIAL

## 2023-12-28 VITALS — HEIGHT: 60 IN | DIASTOLIC BLOOD PRESSURE: 60 MMHG | SYSTOLIC BLOOD PRESSURE: 110 MMHG

## 2023-12-28 PROCEDURE — 0503F POSTPARTUM CARE VISIT: CPT

## 2023-12-28 NOTE — HISTORY OF PRESENT ILLNESS
[FreeTextEntry1] : Doing well postpartum Breastfeeding and supplementing with formula Has great support at home. Denies fevers/chills, pain. VB resolved No si/sx of PPD /PP psychosis. Gyn: perineum well healed, cervix closed Pt declined contraception AKILAH Daley MD

## 2024-07-08 ENCOUNTER — RX RENEWAL (OUTPATIENT)
Age: 34
End: 2024-07-08

## 2024-08-13 ENCOUNTER — APPOINTMENT (OUTPATIENT)
Dept: OBGYN | Facility: CLINIC | Age: 34
End: 2024-08-13
Payer: COMMERCIAL

## 2024-08-13 VITALS
HEIGHT: 60 IN | WEIGHT: 137 LBS | BODY MASS INDEX: 26.9 KG/M2 | SYSTOLIC BLOOD PRESSURE: 102 MMHG | DIASTOLIC BLOOD PRESSURE: 64 MMHG

## 2024-08-13 PROCEDURE — 56420 I&D BARTHOLINS GLAND ABSCESS: CPT

## 2024-08-14 NOTE — HISTORY OF PRESENT ILLNESS
[FreeTextEntry1] : HPI: 35yo presents with vaginal pain and pressure. Denies fevers/chills. Denies AUB  Gyn: R bartholin's cyst seen  Recommended I&D, pt agreed. Informed consent obtained Care instructions and precautions reviewed 20min spent AKILAH aDley MD  Procedure: Bartholins cyst I&D Area cleaned with betadine and injected with lidocaine Scalpel used to incise Bartholin's cyst Puss evacuated and cyst flushed with sterile saline hemostasis at the site of incision obtained with silver nitrate Patient tolerated the procedure well  AKILAH Daley MD

## 2024-08-14 NOTE — HISTORY OF PRESENT ILLNESS
[FreeTextEntry1] : HPI: 33yo presents with vaginal pain and pressure. Denies fevers/chills. Denies AUB  Gyn: R bartholin's cyst seen  Recommended I&D, pt agreed. Informed consent obtained Care instructions and precautions reviewed 20min spent AKILAH Dalye MD  Procedure: Bartholins cyst I&D Area cleaned with betadine and injected with lidocaine Scalpel used to incise Bartholin's cyst Puss evacuated and cyst flushed with sterile saline hemostasis at the site of incision obtained with silver nitrate Patient tolerated the procedure well  AKILAH Daley MD

## 2024-09-05 ENCOUNTER — APPOINTMENT (OUTPATIENT)
Dept: OBGYN | Facility: CLINIC | Age: 34
End: 2024-09-05

## 2024-11-21 ENCOUNTER — APPOINTMENT (OUTPATIENT)
Dept: INTERNAL MEDICINE | Facility: CLINIC | Age: 34
End: 2024-11-21

## 2025-01-29 ENCOUNTER — APPOINTMENT (OUTPATIENT)
Dept: OBGYN | Facility: CLINIC | Age: 35
End: 2025-01-29

## 2025-03-07 ENCOUNTER — APPOINTMENT (OUTPATIENT)
Dept: INTERNAL MEDICINE | Facility: CLINIC | Age: 35
End: 2025-03-07

## 2025-03-23 NOTE — ASSESSMENT
[FreeTextEntry1] : AVOID Q TIPS\par RIGHT CHRONIC SIALOADENITIS/ SIALOLITHIASIS\par US \par HYDRATION\par WILL REFER TO DR HAMMONDS FOR FURTHER ADVISE AND MANAGEMENT\par 
Patient baseline mental status

## 2025-07-22 ENCOUNTER — APPOINTMENT (OUTPATIENT)
Dept: OBGYN | Facility: CLINIC | Age: 35
End: 2025-07-22
Payer: COMMERCIAL

## 2025-07-22 VITALS
HEIGHT: 60 IN | SYSTOLIC BLOOD PRESSURE: 106 MMHG | WEIGHT: 137 LBS | DIASTOLIC BLOOD PRESSURE: 62 MMHG | BODY MASS INDEX: 26.9 KG/M2

## 2025-07-22 LAB
HCG UR QL: POSITIVE
QUALITY CONTROL: YES

## 2025-07-22 PROCEDURE — 76817 TRANSVAGINAL US OBSTETRIC: CPT

## 2025-07-22 PROCEDURE — 81025 URINE PREGNANCY TEST: CPT

## 2025-07-22 PROCEDURE — 99215 OFFICE O/P EST HI 40 MIN: CPT | Mod: 25

## 2025-08-12 ENCOUNTER — APPOINTMENT (OUTPATIENT)
Dept: ANTEPARTUM | Facility: CLINIC | Age: 35
End: 2025-08-12
Payer: COMMERCIAL

## 2025-08-12 ENCOUNTER — ASOB RESULT (OUTPATIENT)
Age: 35
End: 2025-08-12

## 2025-08-12 DIAGNOSIS — Z78.9 OTHER SPECIFIED HEALTH STATUS: ICD-10-CM

## 2025-08-12 PROCEDURE — 36415 COLL VENOUS BLD VENIPUNCTURE: CPT

## 2025-08-12 PROCEDURE — 76813 OB US NUCHAL MEAS 1 GEST: CPT

## 2025-08-13 LAB
1ST TRIMESTER SCN+NT PATIENT-IMP: NORMAL
1ST TRIMESTER SCN+NT PATIENT-IMP: NORMAL
2ND TRIMESTER 4 SCREEN SERPL-IMP: NO
2ND TRIMESTER 4 SCREEN SERPL-IMP: NORMAL
AFP ADJ MOM SERPL: 1.01
AFP PERCENTILE: 51.1
AFP SERPL-MCNC: 19.51
AGE AT DELIVERY: 31.2
B-HCG FREE MOM PRCTL SERPL: 18.9
B-HCG FREE MOM PRCTL SERPL: 24.7
B-HCG FREE MOM SERPL: 0.58
B-HCG FREE SERPL-MCNC: 21.74 NG/ML
CHORION TYPE: NORMAL
CURRENT SMOKER: NORMAL
DIABETES STATUS PATIENT: NORMAL
EGG DONOR AGE: 30
FET CRL US.MEAS: 71.8 MM
FET NASAL BN: PRESENT
FET NUCHAL FOLD MOM THICKNESS US.MEAS: 1.6
FET TS 18 PRIOR RISK FROM MAT AGE: NORMAL
FET TS 21 RISK FROM MAT AGE: NORMAL
GA: NORMAL
GA: NORMAL
HX OF TRISOMY 21 QL: NO
INHIBIN A ADJ MOM SERPL: 0.84
INHIBIN A SERPL-MCNC: 178.4 PG/ML
INHIBIN PERCENTILE: 36.8
MATERNAL RISK FACTORS: NORMAL
MULTIPLE PREGNANCY: NORMAL
NT  MOM: 0.93
PAPP-A ADJ MOM SERPL: 0.54
PAPP-A MOM PRCTL SERPL: 13.1
PAPP-A SERPL-ACNC: 2300 MU/L
PIGF SER-MCNC: 38.38 PG/ML
PLGF MOM: 0.76
SONOGRAPHER NAME: NORMAL
TEST PERFORMANCE INFO SPEC: NORMAL
TRISOMY 18+13 RISK FETUS: NORMAL
TS 21 RISK FETUS: NORMAL
US DATE: NORMAL

## 2025-08-19 DIAGNOSIS — Z34.91 ENCOUNTER FOR SUPERVISION OF NORMAL PREGNANCY, UNSPECIFIED, FIRST TRIMESTER: ICD-10-CM

## 2025-08-22 ENCOUNTER — LABORATORY RESULT (OUTPATIENT)
Age: 35
End: 2025-08-22

## 2025-08-22 ENCOUNTER — APPOINTMENT (OUTPATIENT)
Dept: OBGYN | Facility: CLINIC | Age: 35
End: 2025-08-22
Payer: COMMERCIAL

## 2025-08-22 VITALS
SYSTOLIC BLOOD PRESSURE: 114 MMHG | WEIGHT: 145.38 LBS | DIASTOLIC BLOOD PRESSURE: 62 MMHG | BODY MASS INDEX: 28.54 KG/M2 | HEIGHT: 60 IN

## 2025-08-22 DIAGNOSIS — Z3A.12 12 WEEKS GESTATION OF PREGNANCY: ICD-10-CM

## 2025-08-22 DIAGNOSIS — Z34.01 ENCOUNTER FOR SUPERVISION OF NORMAL FIRST PREGNANCY, FIRST TRIMESTER: ICD-10-CM

## 2025-08-22 PROCEDURE — 76816 OB US FOLLOW-UP PER FETUS: CPT

## 2025-08-22 PROCEDURE — 99215 OFFICE O/P EST HI 40 MIN: CPT | Mod: 25

## 2025-08-23 LAB
APPEARANCE: ABNORMAL
BASOPHILS # BLD AUTO: 0.05 K/UL
BASOPHILS NFR BLD AUTO: 0.7 %
BILIRUBIN URINE: NEGATIVE
BLOOD URINE: NEGATIVE
COLOR: YELLOW
EOSINOPHIL # BLD AUTO: 0.1 K/UL
EOSINOPHIL NFR BLD AUTO: 1.3 %
ESTIMATED AVERAGE GLUCOSE: 103 MG/DL
GLUCOSE QUALITATIVE U: NEGATIVE MG/DL
HBA1C MFR BLD HPLC: 5.2 %
HBV SURFACE AG SER QL: NONREACTIVE
HCT VFR BLD CALC: 38.7 %
HCV AB SER QL: NONREACTIVE
HCV S/CO RATIO: 0.09 S/CO
HGB BLD-MCNC: 12.8 G/DL
HIV1+2 AB SPEC QL IA.RAPID: NONREACTIVE
IMM GRANULOCYTES NFR BLD AUTO: 1.2 %
KETONES URINE: NEGATIVE MG/DL
LEUKOCYTE ESTERASE URINE: ABNORMAL
LYMPHOCYTES # BLD AUTO: 2.68 K/UL
LYMPHOCYTES NFR BLD AUTO: 36.1 %
MAN DIFF?: NORMAL
MCHC RBC-ENTMCNC: 28.6 PG
MCHC RBC-ENTMCNC: 33.1 G/DL
MCV RBC AUTO: 86.4 FL
MONOCYTES # BLD AUTO: 0.46 K/UL
MONOCYTES NFR BLD AUTO: 6.2 %
NEUTROPHILS # BLD AUTO: 4.05 K/UL
NEUTROPHILS NFR BLD AUTO: 54.5 %
NITRITE URINE: NEGATIVE
PH URINE: 6.5
PLATELET # BLD AUTO: 296 K/UL
PROTEIN URINE: NEGATIVE MG/DL
RBC # BLD: 4.48 M/UL
RBC # FLD: 13.5 %
SPECIFIC GRAVITY URINE: 1.02
T PALLIDUM AB SER QL IA: NEGATIVE
UROBILINOGEN URINE: 0.2 MG/DL
WBC # FLD AUTO: 7.43 K/UL

## 2025-08-25 LAB
ABORH: NORMAL
ANTIBODY SCREEN: NORMAL
BACTERIA UR CULT: NORMAL
C TRACH RRNA SPEC QL NAA+PROBE: NOT DETECTED
HGB A MFR BLD: 97.4 %
HGB A2 MFR BLD: 2.6 %
HGB FRACT BLD-IMP: NORMAL
HPV HIGH+LOW RISK DNA PNL CVX: NOT DETECTED
LEAD BLD-MCNC: <1 UG/DL
MEV IGG FLD QL IA: 30.6 AU/ML
MEV IGG+IGM SER-IMP: POSITIVE
N GONORRHOEA RRNA SPEC QL NAA+PROBE: NOT DETECTED
RUBV IGG FLD-ACNC: 2.95 INDEX
RUBV IGG SER-IMP: POSITIVE
SOURCE AMPLIFICATION: NORMAL
VZV AB TITR SER: POSITIVE
VZV IGG SER IF-ACNC: 7.25 S/CO

## 2025-08-26 LAB
B19V IGG SER QL IA: 5.95 INDEX
B19V IGG+IGM SER-IMP: NORMAL
B19V IGG+IGM SER-IMP: POSITIVE
B19V IGM FLD-ACNC: 0.07 INDEX
B19V IGM SER-ACNC: NEGATIVE
CYTOLOGY CVX/VAG DOC THIN PREP: NORMAL
M TB IFN-G BLD-IMP: NEGATIVE
QUANTIFERON TB PLUS MITOGEN MINUS NIL: >10 IU/ML
QUANTIFERON TB PLUS NIL: 0.01 IU/ML
QUANTIFERON TB PLUS TB1 MINUS NIL: 0.01 IU/ML
QUANTIFERON TB PLUS TB2 MINUS NIL: 0 IU/ML

## 2025-09-12 ENCOUNTER — APPOINTMENT (OUTPATIENT)
Dept: ANTEPARTUM | Facility: CLINIC | Age: 35
End: 2025-09-12
Payer: COMMERCIAL

## 2025-09-12 PROCEDURE — 36415 COLL VENOUS BLD VENIPUNCTURE: CPT

## 2025-09-15 LAB
1ST TRIMESTER SCN+NT PATIENT-IMP: NORMAL
2ND TRIMESTER 4 SCREEN SERPL-IMP: NO
AFP ADJ MOM SERPL: 1.33
AFP INTERP SERPL-IMP: NORMAL
AFP INTERP SERPL-IMP: NORMAL
AFP MOM CUT-OFF: 2.5
AFP PERCENTILE: 81
AFP SERPL-ACNC: 54.5 NG/ML
AGE AT DELIVERY: 31.3
B-HCG FREE MOM PRCTL SERPL: 16
B-HCG FREE MOM SERPL: 0.55
B-HCG FREE SERPL-MCNC: 6.12 NG/ML
CARBAMAZEPINE?: NO
COLLECT DATE: NORMAL
CURRENT SMOKER: NO
DIABETES STATUS PATIENT: NO
EGG DONOR AGE: 30
FET CRL US.MEAS: 71.8 MM
FET NASAL BN: PRESENT
FET NUCHAL FOLD MOM THICKNESS US.MEAS: 1.6 MM
FET TS 18 PRIOR RISK FROM MAT AGE: NORMAL
FET TS 21 RISK FROM MAT AGE: NORMAL
GA: NORMAL
HX OF NTD NARR: NORMAL
HX OF TRISOMY 21 QL: NO
INHIBIN A ADJ MOM SERPL: 0.83
INHIBIN PERCENTILE: 31.9
INHIBIN SERPL-MCNC: 117 PG/ML
MATERNAL RISK FACTORS: NORMAL
MULTIPLE PREGNANCY: NORMAL
NEURAL TUBE DEFECT RISK FETUS: NORMAL
NEURAL TUBE DEFECT RISK POP: NORMAL
NT  MOM: 0.93
PAPP-A ADJ MOM SERPL: 0.54
PAPP-A MOM PRCTL SERPL: 13.1
PAPP-A SERPL-ACNC: 2300 MIU/L
TEST PERFORMANCE INFO SPEC: NORMAL
TRISOMY 18+13 RISK FETUS: NORMAL
TS 21 RISK FETUS: NORMAL
U ESTRIOL ADJ MOM SERPL: 1.14
U ESTRIOL SERPL-SCNC: 2.05 NMOL/L
UNCONJUGATED ESTRIOL PERCENTILE: 59.4
VALPROIC ACID?: NORMAL

## 2025-09-18 ENCOUNTER — APPOINTMENT (OUTPATIENT)
Dept: OBGYN | Facility: CLINIC | Age: 35
End: 2025-09-18
Payer: COMMERCIAL

## 2025-09-18 VITALS
BODY MASS INDEX: 29.45 KG/M2 | DIASTOLIC BLOOD PRESSURE: 60 MMHG | WEIGHT: 150 LBS | HEIGHT: 60 IN | SYSTOLIC BLOOD PRESSURE: 100 MMHG

## 2025-09-18 PROCEDURE — 0502F SUBSEQUENT PRENATAL CARE: CPT

## 2025-09-18 PROCEDURE — 76816 OB US FOLLOW-UP PER FETUS: CPT

## (undated) DEVICE — TUBING MEDI-VAC W MAXIGRIP CONNECTORS 1/4"X6'

## (undated) DEVICE — SUCTION YANKAUER TAPERED BULBOUS NO VENT

## (undated) DEVICE — VENODYNE/SCD SLEEVE CALF MEDIUM

## (undated) DEVICE — LAP PAD W RING 18 X 18"

## (undated) DEVICE — GLV 8.5 PROTEXIS (WHITE)

## (undated) DEVICE — PACK LITHOTOMY

## (undated) DEVICE — WARMING BLANKET UPPER ADULT

## (undated) DEVICE — TUBING ASPIRATION HANDLE

## (undated) DEVICE — GLV 7 PROTEXIS (WHITE)

## (undated) DEVICE — NDL COUNTER FOAM AND MAGNET 40-70

## (undated) DEVICE — DRAPE LIGHT HANDLE COVER (GREEN)